# Patient Record
Sex: MALE | Race: WHITE | NOT HISPANIC OR LATINO
[De-identification: names, ages, dates, MRNs, and addresses within clinical notes are randomized per-mention and may not be internally consistent; named-entity substitution may affect disease eponyms.]

---

## 2017-08-05 ENCOUNTER — TRANSCRIPTION ENCOUNTER (OUTPATIENT)
Age: 67
End: 2017-08-05

## 2017-10-07 ENCOUNTER — OUTPATIENT (OUTPATIENT)
Dept: OUTPATIENT SERVICES | Facility: HOSPITAL | Age: 67
LOS: 1 days | End: 2017-10-07
Payer: COMMERCIAL

## 2017-10-07 ENCOUNTER — APPOINTMENT (OUTPATIENT)
Dept: ULTRASOUND IMAGING | Facility: CLINIC | Age: 67
End: 2017-10-07

## 2017-10-07 DIAGNOSIS — Z00.8 ENCOUNTER FOR OTHER GENERAL EXAMINATION: ICD-10-CM

## 2017-10-07 PROCEDURE — 76775 US EXAM ABDO BACK WALL LIM: CPT

## 2017-10-07 PROCEDURE — 76770 US EXAM ABDO BACK WALL COMP: CPT

## 2017-10-07 PROCEDURE — 76775 US EXAM ABDO BACK WALL LIM: CPT | Mod: 26

## 2017-10-07 PROCEDURE — 76770 US EXAM ABDO BACK WALL COMP: CPT | Mod: 26

## 2017-10-11 ENCOUNTER — TRANSCRIPTION ENCOUNTER (OUTPATIENT)
Age: 67
End: 2017-10-11

## 2017-10-11 ENCOUNTER — APPOINTMENT (OUTPATIENT)
Dept: UROLOGY | Facility: CLINIC | Age: 67
End: 2017-10-11
Payer: COMMERCIAL

## 2017-10-11 VITALS
WEIGHT: 200 LBS | RESPIRATION RATE: 17 BRPM | TEMPERATURE: 98.3 F | HEART RATE: 71 BPM | DIASTOLIC BLOOD PRESSURE: 73 MMHG | HEIGHT: 68 IN | SYSTOLIC BLOOD PRESSURE: 150 MMHG | BODY MASS INDEX: 30.31 KG/M2

## 2017-10-11 DIAGNOSIS — Z78.9 OTHER SPECIFIED HEALTH STATUS: ICD-10-CM

## 2017-10-11 DIAGNOSIS — Z86.39 PERSONAL HISTORY OF OTHER ENDOCRINE, NUTRITIONAL AND METABOLIC DISEASE: ICD-10-CM

## 2017-10-11 DIAGNOSIS — Z86.79 PERSONAL HISTORY OF OTHER DISEASES OF THE CIRCULATORY SYSTEM: ICD-10-CM

## 2017-10-11 PROCEDURE — 99205 OFFICE O/P NEW HI 60 MIN: CPT

## 2017-10-11 RX ORDER — MUPIROCIN 20 MG/G
2 OINTMENT TOPICAL
Qty: 22 | Refills: 0 | Status: ACTIVE | COMMUNITY
Start: 2017-08-05

## 2017-10-12 LAB — BACTERIA UR CULT: NORMAL

## 2017-10-27 ENCOUNTER — OUTPATIENT (OUTPATIENT)
Dept: OUTPATIENT SERVICES | Facility: HOSPITAL | Age: 67
LOS: 1 days | End: 2017-10-27
Payer: COMMERCIAL

## 2017-10-27 VITALS
WEIGHT: 201.06 LBS | HEIGHT: 68 IN | DIASTOLIC BLOOD PRESSURE: 81 MMHG | SYSTOLIC BLOOD PRESSURE: 140 MMHG | OXYGEN SATURATION: 100 % | TEMPERATURE: 98 F | HEART RATE: 64 BPM | RESPIRATION RATE: 16 BRPM

## 2017-10-27 DIAGNOSIS — I10 ESSENTIAL (PRIMARY) HYPERTENSION: ICD-10-CM

## 2017-10-27 DIAGNOSIS — Z01.818 ENCOUNTER FOR OTHER PREPROCEDURAL EXAMINATION: ICD-10-CM

## 2017-10-27 DIAGNOSIS — E78.5 HYPERLIPIDEMIA, UNSPECIFIED: ICD-10-CM

## 2017-10-27 DIAGNOSIS — Z98.890 OTHER SPECIFIED POSTPROCEDURAL STATES: Chronic | ICD-10-CM

## 2017-10-27 DIAGNOSIS — N21.0 CALCULUS IN BLADDER: ICD-10-CM

## 2017-10-27 LAB
ANION GAP SERPL CALC-SCNC: 14 MMOL/L — SIGNIFICANT CHANGE UP (ref 5–17)
BUN SERPL-MCNC: 18 MG/DL — SIGNIFICANT CHANGE UP (ref 7–23)
CALCIUM SERPL-MCNC: 9.3 MG/DL — SIGNIFICANT CHANGE UP (ref 8.4–10.5)
CHLORIDE SERPL-SCNC: 98 MMOL/L — SIGNIFICANT CHANGE UP (ref 96–108)
CO2 SERPL-SCNC: 28 MMOL/L — SIGNIFICANT CHANGE UP (ref 22–31)
CREAT SERPL-MCNC: 0.82 MG/DL — SIGNIFICANT CHANGE UP (ref 0.5–1.3)
GLUCOSE SERPL-MCNC: 83 MG/DL — SIGNIFICANT CHANGE UP (ref 70–99)
HCT VFR BLD CALC: 42.4 % — SIGNIFICANT CHANGE UP (ref 39–50)
HGB BLD-MCNC: 14.6 G/DL — SIGNIFICANT CHANGE UP (ref 13–17)
MCHC RBC-ENTMCNC: 33.2 PG — SIGNIFICANT CHANGE UP (ref 27–34)
MCHC RBC-ENTMCNC: 34.4 GM/DL — SIGNIFICANT CHANGE UP (ref 32–36)
MCV RBC AUTO: 96.4 FL — SIGNIFICANT CHANGE UP (ref 80–100)
PLATELET # BLD AUTO: 247 K/UL — SIGNIFICANT CHANGE UP (ref 150–400)
POTASSIUM SERPL-MCNC: 3.5 MMOL/L — SIGNIFICANT CHANGE UP (ref 3.5–5.3)
POTASSIUM SERPL-SCNC: 3.5 MMOL/L — SIGNIFICANT CHANGE UP (ref 3.5–5.3)
RBC # BLD: 4.4 M/UL — SIGNIFICANT CHANGE UP (ref 4.2–5.8)
RBC # FLD: 13.4 % — SIGNIFICANT CHANGE UP (ref 10.3–14.5)
SODIUM SERPL-SCNC: 140 MMOL/L — SIGNIFICANT CHANGE UP (ref 135–145)
WBC # BLD: 8.81 K/UL — SIGNIFICANT CHANGE UP (ref 3.8–10.5)
WBC # FLD AUTO: 8.81 K/UL — SIGNIFICANT CHANGE UP (ref 3.8–10.5)

## 2017-10-27 PROCEDURE — G0463: CPT

## 2017-10-27 PROCEDURE — 80048 BASIC METABOLIC PNL TOTAL CA: CPT

## 2017-10-27 PROCEDURE — 85027 COMPLETE CBC AUTOMATED: CPT

## 2017-10-27 NOTE — H&P PST ADULT - NSANTHOSAYNRD_GEN_A_CORE
No. DAMON screening performed.  STOP BANG Legend: 0-2 = LOW Risk; 3-4 = INTERMEDIATE Risk; 5-8 = HIGH Risk

## 2017-10-27 NOTE — H&P PST ADULT - ABILITY TO HEAR (WITH HEARING AID OR HEARING APPLIANCE IF NORMALLY USED):
No recurrent episodes of acute gouty arthritis. Check uric acid level today. Continue allopurinol 100 mg daily.   Adequate: hears normal conversation without difficulty

## 2017-10-27 NOTE — H&P PST ADULT - HISTORY OF PRESENT ILLNESS
67 Y/O Male  C/O Bilateral Flank pain, pelvic pain 9/2017. S/P Renal ultrasound revealed bilateral cortical cysts with area of cortical atrophy and scarring bilaterally, bladder diverticulum and a 1.1cm bladder stone, enlarged prostate , post void residual approximately 37 cc.  Presents for Cystolitholapaxy .

## 2017-11-03 ENCOUNTER — APPOINTMENT (OUTPATIENT)
Dept: UROLOGY | Facility: HOSPITAL | Age: 67
End: 2017-11-03

## 2017-11-03 ENCOUNTER — OUTPATIENT (OUTPATIENT)
Dept: OUTPATIENT SERVICES | Facility: HOSPITAL | Age: 67
LOS: 1 days | Discharge: HOME | End: 2017-11-03
Payer: COMMERCIAL

## 2017-11-03 ENCOUNTER — RESULT REVIEW (OUTPATIENT)
Age: 67
End: 2017-11-03

## 2017-11-03 ENCOUNTER — TRANSCRIPTION ENCOUNTER (OUTPATIENT)
Age: 67
End: 2017-11-03

## 2017-11-03 VITALS
HEART RATE: 71 BPM | SYSTOLIC BLOOD PRESSURE: 135 MMHG | RESPIRATION RATE: 15 BRPM | DIASTOLIC BLOOD PRESSURE: 68 MMHG | OXYGEN SATURATION: 95 % | TEMPERATURE: 97 F

## 2017-11-03 VITALS
TEMPERATURE: 98 F | WEIGHT: 201.06 LBS | SYSTOLIC BLOOD PRESSURE: 129 MMHG | HEART RATE: 74 BPM | OXYGEN SATURATION: 100 % | DIASTOLIC BLOOD PRESSURE: 80 MMHG | RESPIRATION RATE: 18 BRPM | HEIGHT: 68 IN

## 2017-11-03 DIAGNOSIS — N21.0 CALCULUS IN BLADDER: ICD-10-CM

## 2017-11-03 DIAGNOSIS — Z98.890 OTHER SPECIFIED POSTPROCEDURAL STATES: Chronic | ICD-10-CM

## 2017-11-03 PROCEDURE — 88300 SURGICAL PATH GROSS: CPT | Mod: 26

## 2017-11-03 PROCEDURE — 76000 FLUOROSCOPY <1 HR PHYS/QHP: CPT

## 2017-11-03 PROCEDURE — C1769: CPT

## 2017-11-03 PROCEDURE — 52332 CYSTOSCOPY AND TREATMENT: CPT | Mod: LT

## 2017-11-03 PROCEDURE — 52318 REMOVE BLADDER STONE: CPT

## 2017-11-03 PROCEDURE — 52317 REMOVE BLADDER STONE: CPT

## 2017-11-03 PROCEDURE — C1758: CPT

## 2017-11-03 PROCEDURE — C1889: CPT

## 2017-11-03 PROCEDURE — 74420 UROGRAPHY RTRGR +-KUB: CPT | Mod: 26

## 2017-11-03 PROCEDURE — 88300 SURGICAL PATH GROSS: CPT

## 2017-11-03 PROCEDURE — 52300 CYSTOSCOPY AND TREATMENT: CPT

## 2017-11-03 PROCEDURE — C2617: CPT

## 2017-11-03 PROCEDURE — 82365 CALCULUS SPECTROSCOPY: CPT

## 2017-11-03 PROCEDURE — 87086 URINE CULTURE/COLONY COUNT: CPT

## 2017-11-03 RX ORDER — OXYCODONE AND ACETAMINOPHEN 5; 325 MG/1; MG/1
1 TABLET ORAL EVERY 4 HOURS
Qty: 0 | Refills: 0 | Status: DISCONTINUED | OUTPATIENT
Start: 2017-11-03 | End: 2017-11-03

## 2017-11-03 RX ORDER — OMEPRAZOLE 10 MG/1
1 CAPSULE, DELAYED RELEASE ORAL
Qty: 0 | Refills: 0 | COMMUNITY

## 2017-11-03 RX ORDER — TAMSULOSIN HYDROCHLORIDE 0.4 MG/1
0.4 CAPSULE ORAL ONCE
Qty: 0 | Refills: 0 | Status: COMPLETED | OUTPATIENT
Start: 2017-11-03 | End: 2017-11-03

## 2017-11-03 RX ORDER — ACETAMINOPHEN 500 MG
2 TABLET ORAL
Qty: 0 | Refills: 0 | COMMUNITY
Start: 2017-11-03

## 2017-11-03 RX ORDER — SODIUM CHLORIDE 9 MG/ML
3 INJECTION INTRAMUSCULAR; INTRAVENOUS; SUBCUTANEOUS EVERY 8 HOURS
Qty: 0 | Refills: 0 | Status: DISCONTINUED | OUTPATIENT
Start: 2017-11-03 | End: 2017-11-03

## 2017-11-03 RX ORDER — CEPHALEXIN 500 MG
1 CAPSULE ORAL
Qty: 15 | Refills: 0 | OUTPATIENT
Start: 2017-11-03 | End: 2017-11-08

## 2017-11-03 RX ORDER — ATORVASTATIN CALCIUM 80 MG/1
1 TABLET, FILM COATED ORAL
Qty: 0 | Refills: 0 | COMMUNITY

## 2017-11-03 RX ORDER — TAMSULOSIN HYDROCHLORIDE 0.4 MG/1
1 CAPSULE ORAL
Qty: 60 | Refills: 0 | OUTPATIENT
Start: 2017-11-03 | End: 2017-12-03

## 2017-11-03 RX ORDER — CEFAZOLIN SODIUM 1 G
2000 VIAL (EA) INJECTION ONCE
Qty: 0 | Refills: 0 | Status: COMPLETED | OUTPATIENT
Start: 2017-11-03 | End: 2017-11-03

## 2017-11-03 RX ORDER — ACETAMINOPHEN 500 MG
650 TABLET ORAL EVERY 6 HOURS
Qty: 0 | Refills: 0 | Status: DISCONTINUED | OUTPATIENT
Start: 2017-11-03 | End: 2017-11-18

## 2017-11-03 RX ORDER — ONDANSETRON 8 MG/1
4 TABLET, FILM COATED ORAL EVERY 8 HOURS
Qty: 0 | Refills: 0 | Status: DISCONTINUED | OUTPATIENT
Start: 2017-11-03 | End: 2017-11-03

## 2017-11-03 RX ORDER — LOSARTAN/HYDROCHLOROTHIAZIDE 100MG-25MG
1 TABLET ORAL
Qty: 0 | Refills: 0 | COMMUNITY

## 2017-11-03 RX ORDER — HYDROMORPHONE HYDROCHLORIDE 2 MG/ML
0.5 INJECTION INTRAMUSCULAR; INTRAVENOUS; SUBCUTANEOUS
Qty: 0 | Refills: 0 | Status: DISCONTINUED | OUTPATIENT
Start: 2017-11-03 | End: 2017-11-03

## 2017-11-03 RX ORDER — SODIUM CHLORIDE 9 MG/ML
1000 INJECTION, SOLUTION INTRAVENOUS
Qty: 0 | Refills: 0 | Status: DISCONTINUED | OUTPATIENT
Start: 2017-11-03 | End: 2017-11-18

## 2017-11-03 RX ADMIN — TAMSULOSIN HYDROCHLORIDE 0.4 MILLIGRAM(S): 0.4 CAPSULE ORAL at 14:35

## 2017-11-03 NOTE — ASU DISCHARGE PLAN (ADULT/PEDIATRIC). - NOTIFY
Inability to Tolerate Liquids or Foods/Unable to Urinate/Persistent Nausea and Vomiting/Fever greater than 101/Bleeding that does not stop

## 2017-11-03 NOTE — ASU DISCHARGE PLAN (ADULT/PEDIATRIC). - MEDICATION SUMMARY - MEDICATIONS TO TAKE
I will START or STAY ON the medications listed below when I get home from the hospital:    acetaminophen 325 mg oral tablet  -- 2 tab(s) by mouth every 6 hours, As needed, Mild Pain (1 - 3)  -- Indication: For mild pain    oxyCODONE-acetaminophen 5 mg-325 mg oral tablet  -- 1 tab(s) by mouth every 6 hours, As Needed -moderate to severe pain MDD:4 tabs  -- Indication: For moderate to severe pain    Flomax 0.4 mg oral capsule  -- 1 cap(s) by mouth 2 times a day   -- It is very important that you take or use this exactly as directed.  Do not skip doses or discontinue unless directed by your doctor.  May cause drowsiness.  Alcohol may intensify this effect.  Use care when operating dangerous machinery.  Some non-prescription drugs may aggravate your condition.  Read all labels carefully.  If a warning appears, check with your doctor before taking.  Swallow whole.  Do not crush.  Take with food or milk.    -- Indication: For enlarged prostate, stent pain    Lipitor 10 mg oral tablet  -- 1 tab(s) by mouth once a day  -- Indication: For home med    losartan-hydrochlorothiazide 100mg-25mg oral tablet  -- 1 tab(s) by mouth once a day  -- Indication: For home med    Keflex 500 mg oral capsule  -- 1 cap(s) by mouth every 8 hours   -- Finish all this medication unless otherwise directed by prescriber.    -- Indication: For post-op antibiotics    omeprazole 40 mg oral delayed release capsule  -- 1 cap(s) by mouth once a day  -- Indication: For home med

## 2017-11-03 NOTE — BRIEF OPERATIVE NOTE - PROCEDURE
<<-----Click on this checkbox to enter Procedure Cystolitholapaxy  11/03/2017    Active  [id number]  Cystoscopy with resection of orthotopic ureterocele  11/03/2017    Active  [id number]

## 2017-11-03 NOTE — BRIEF OPERATIVE NOTE - POST-OP DX
Bladder diverticulum  11/03/2017    Active  Trevor Suarez  Ureterocele, congenital  11/03/2017    Active  Trevor Suarez

## 2017-11-03 NOTE — BRIEF OPERATIVE NOTE - PRE-OP DX
Bladder diverticulum  11/03/2017    Active  Trevor Suarez  Bladder stone  11/03/2017    Active  Trevor Suarez

## 2017-11-05 LAB
CULTURE RESULTS: SIGNIFICANT CHANGE UP
SPECIMEN SOURCE: SIGNIFICANT CHANGE UP

## 2017-11-06 LAB — SURGICAL PATHOLOGY STUDY: SIGNIFICANT CHANGE UP

## 2017-11-07 LAB — COMPN STONE: SIGNIFICANT CHANGE UP

## 2017-11-29 ENCOUNTER — OUTPATIENT (OUTPATIENT)
Dept: OUTPATIENT SERVICES | Facility: HOSPITAL | Age: 67
LOS: 1 days | End: 2017-11-29
Payer: COMMERCIAL

## 2017-11-29 ENCOUNTER — APPOINTMENT (OUTPATIENT)
Dept: UROLOGY | Facility: CLINIC | Age: 67
End: 2017-11-29
Payer: COMMERCIAL

## 2017-11-29 VITALS
RESPIRATION RATE: 17 BRPM | HEART RATE: 77 BPM | SYSTOLIC BLOOD PRESSURE: 132 MMHG | DIASTOLIC BLOOD PRESSURE: 81 MMHG | TEMPERATURE: 98.8 F

## 2017-11-29 DIAGNOSIS — Z98.890 OTHER SPECIFIED POSTPROCEDURAL STATES: Chronic | ICD-10-CM

## 2017-11-29 DIAGNOSIS — N21.0 CALCULUS IN BLADDER: ICD-10-CM

## 2017-11-29 PROCEDURE — 52310 CYSTOSCOPY AND TREATMENT: CPT

## 2017-11-29 PROCEDURE — 99214 OFFICE O/P EST MOD 30 MIN: CPT | Mod: 25

## 2017-12-04 DIAGNOSIS — N32.3 DIVERTICULUM OF BLADDER: ICD-10-CM

## 2017-12-04 DIAGNOSIS — N40.1 BENIGN PROSTATIC HYPERPLASIA WITH LOWER URINARY TRACT SYMPTOMS: ICD-10-CM

## 2017-12-04 DIAGNOSIS — N21.0 CALCULUS IN BLADDER: ICD-10-CM

## 2018-01-24 ENCOUNTER — APPOINTMENT (OUTPATIENT)
Dept: UROLOGY | Facility: CLINIC | Age: 68
End: 2018-01-24
Payer: MEDICARE

## 2018-01-24 DIAGNOSIS — R82.99 OTHER ABNORMAL FINDINGS IN URINE: ICD-10-CM

## 2018-01-24 DIAGNOSIS — N32.3 DIVERTICULUM OF BLADDER: ICD-10-CM

## 2018-01-24 PROCEDURE — 99213 OFFICE O/P EST LOW 20 MIN: CPT

## 2018-01-24 RX ORDER — CEPHALEXIN 500 MG/1
500 CAPSULE ORAL
Qty: 10 | Refills: 0 | Status: COMPLETED | COMMUNITY
Start: 2017-08-10 | End: 2018-01-24

## 2018-04-22 ENCOUNTER — EMERGENCY (EMERGENCY)
Facility: HOSPITAL | Age: 68
LOS: 0 days | Discharge: HOME | End: 2018-04-22
Attending: EMERGENCY MEDICINE | Admitting: EMERGENCY MEDICINE
Payer: COMMERCIAL

## 2018-04-22 VITALS
RESPIRATION RATE: 17 BRPM | SYSTOLIC BLOOD PRESSURE: 169 MMHG | OXYGEN SATURATION: 96 % | DIASTOLIC BLOOD PRESSURE: 106 MMHG | TEMPERATURE: 98 F | HEART RATE: 91 BPM

## 2018-04-22 VITALS — HEIGHT: 68 IN | WEIGHT: 207.01 LBS

## 2018-04-22 DIAGNOSIS — Z98.890 OTHER SPECIFIED POSTPROCEDURAL STATES: Chronic | ICD-10-CM
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
## 2018-04-22 DIAGNOSIS — R39.15 URGENCY OF URINATION: ICD-10-CM

## 2018-04-22 DIAGNOSIS — E78.5 HYPERLIPIDEMIA, UNSPECIFIED: ICD-10-CM

## 2018-04-22 DIAGNOSIS — S01.21XA LACERATION WITHOUT FOREIGN BODY OF NOSE, INITIAL ENCOUNTER: ICD-10-CM

## 2018-04-22 DIAGNOSIS — S00.81XA ABRASION OF OTHER PART OF HEAD, INITIAL ENCOUNTER: ICD-10-CM

## 2018-04-22 DIAGNOSIS — S02.2XXA FRACTURE OF NASAL BONES, INITIAL ENCOUNTER FOR CLOSED FRACTURE: ICD-10-CM

## 2018-04-22 DIAGNOSIS — I10 ESSENTIAL (PRIMARY) HYPERTENSION: ICD-10-CM

## 2018-04-22 DIAGNOSIS — N40.1 BENIGN PROSTATIC HYPERPLASIA WITH LOWER URINARY TRACT SYMPTOMS: ICD-10-CM

## 2018-04-22 PROCEDURE — 12011 RPR F/E/E/N/L/M 2.5 CM/<: CPT

## 2018-04-22 PROCEDURE — 72125 CT NECK SPINE W/O DYE: CPT | Mod: 26

## 2018-04-22 PROCEDURE — 76377 3D RENDER W/INTRP POSTPROCES: CPT | Mod: 26

## 2018-04-22 PROCEDURE — 70450 CT HEAD/BRAIN W/O DYE: CPT | Mod: 26

## 2018-04-22 PROCEDURE — 99283 EMERGENCY DEPT VISIT LOW MDM: CPT

## 2018-04-22 PROCEDURE — 70486 CT MAXILLOFACIAL W/O DYE: CPT | Mod: 26

## 2018-04-22 RX ORDER — ACETAMINOPHEN 500 MG
1000 TABLET ORAL ONCE
Qty: 0 | Refills: 0 | Status: COMPLETED | OUTPATIENT
Start: 2018-04-22 | End: 2018-04-22

## 2018-04-22 RX ADMIN — Medication 1 TABLET(S): at 23:19

## 2018-04-22 RX ADMIN — Medication 1000 MILLIGRAM(S): at 20:26

## 2018-04-22 NOTE — ED ADULT NURSE NOTE - CHPI ED SYMPTOMS NEG
no confusion/no loss of consciousness/no deformity/no numbness/no fever/no vomiting/no weakness/no tingling/no bleeding

## 2018-04-22 NOTE — ED STATDOCS - SKIN, MLM
Large abrasion across mid-forehead running horizontally, abrasion and laceration over bridge of nose laceration is 1cm running vertically, irregular, superficial with no active bleeding. tenderness over nasal bridge, no septal hematoma, no orbital tenderness

## 2018-04-22 NOTE — ED STATDOCS - OBJECTIVE STATEMENT
68 y/o male with PMHx of BPH, kidney stones, presets to the ED s/p fall into his [employer]. Pt confirms that he "saw stars" but did not lose consciousness. Pt states he tried to clean the abrasion. Pt confirms to the sight of the abrasion. Pt did experience some epistaxis. Patient's Tetanus shot is up to date. Pt is not on anticoagulants. Pt did not take any medications for the pain. PCP Dr. Lujan

## 2018-04-22 NOTE — ED STATDOCS - ATTENDING CONTRIBUTION TO CARE
Attending Contribution to Care: I, Marija Cruz, performed the initial face to face bedside interview with this patient regarding history of present illness, review of symptoms and relevant past medical, social and family history.  I completed an independent physical examination.  I was the initial provider who evaluated this patient. I have signed out the follow up of any pending tests (i.e. labs, radiological studies) to the ACP.  I have communicated the patient’s plan of care and disposition with the ACP.

## 2018-04-22 NOTE — ED STATDOCS - PHYSICAL EXAMINATION
GEN: AOX3, NAD. HEENT: Throat clear. +1.0 cm vertical laceration noted over external nose. Mild STS external nose. slight deformity of nose is noted. +Tenderness. Minimal bleeding. Head NC/AT. +Abrasions on forehead. NECK: Supple, No JVD. FROM. C-spine non-tender. CV:S1S2, RRR, LUNGS: CTA b/l, no w/r/r. ABD: Soft, NT/ND, no rebound, no guarding. No CVAT. EXT: No e/c/c. 2+ distal pulses. SKIN: No rashes. NEURO: No focal deficits. CN II-XII intact. FROM. 5/5 motor and sensory. MANDY Morel

## 2018-04-22 NOTE — ED ADULT TRIAGE NOTE - CHIEF COMPLAINT QUOTE
pt s/p mechanical trip and fall in garage. -LOC. pt struck forehead and nose. pt denies blood thinners. pt has laceration on bridge of nose.

## 2018-04-22 NOTE — ED STATDOCS - PROGRESS NOTE DETAILS
Patient is feeling much better, tests/labs reviewed. case discussed with attending. OK to dc home. MANDY Morel

## 2018-04-22 NOTE — ED PROCEDURE NOTE - PROCEDURE ADDITIONAL DETAILS
steri strips applied. patient stable, tolerated well. Patient is feeling much better, tests/labs reviewed. case discussed with attending. OK to dc home. MANDY Morel

## 2018-04-22 NOTE — ED STATDOCS - PMH
Benign prostatic hyperplasia with urinary frequency    Calculus in bladder    Gastroesophageal reflux disease without esophagitis    Hyperlipidemia, unspecified hyperlipidemia type    Hypertension, unspecified type    Kidney cysts    Kidney stone    Obesity (BMI 30.0-34.9)    Shoulder disorder

## 2018-04-22 NOTE — ED STATDOCS - CARE PLAN
Principal Discharge DX:	Nasal fracture  Secondary Diagnosis:	Nasal laceration  Secondary Diagnosis:	Facial injury

## 2018-07-19 PROBLEM — N21.0 CALCULUS IN BLADDER: Chronic | Status: ACTIVE | Noted: 2017-10-27

## 2018-07-19 PROBLEM — K21.9 GASTRO-ESOPHAGEAL REFLUX DISEASE WITHOUT ESOPHAGITIS: Chronic | Status: ACTIVE | Noted: 2017-10-27

## 2018-07-19 PROBLEM — N28.1 CYST OF KIDNEY, ACQUIRED: Chronic | Status: ACTIVE | Noted: 2017-10-27

## 2018-07-19 PROBLEM — N20.0 CALCULUS OF KIDNEY: Chronic | Status: ACTIVE | Noted: 2017-10-27

## 2018-07-19 PROBLEM — E66.9 OBESITY, UNSPECIFIED: Chronic | Status: ACTIVE | Noted: 2017-10-27

## 2018-07-19 PROBLEM — N40.1 BENIGN PROSTATIC HYPERPLASIA WITH LOWER URINARY TRACT SYMPTOMS: Chronic | Status: ACTIVE | Noted: 2017-10-27

## 2018-07-19 PROBLEM — M25.9 JOINT DISORDER, UNSPECIFIED: Chronic | Status: ACTIVE | Noted: 2017-10-27

## 2018-07-19 PROBLEM — I10 ESSENTIAL (PRIMARY) HYPERTENSION: Chronic | Status: ACTIVE | Noted: 2017-10-27

## 2018-07-19 PROBLEM — E78.5 HYPERLIPIDEMIA, UNSPECIFIED: Chronic | Status: ACTIVE | Noted: 2017-10-27

## 2018-07-23 ENCOUNTER — APPOINTMENT (OUTPATIENT)
Dept: UROLOGY | Facility: CLINIC | Age: 68
End: 2018-07-23

## 2018-07-30 ENCOUNTER — FORM ENCOUNTER (OUTPATIENT)
Age: 68
End: 2018-07-30

## 2018-07-30 PROBLEM — Z87.19 HISTORY OF GASTROESOPHAGEAL REFLUX (GERD): Status: RESOLVED | Noted: 2018-07-30 | Resolved: 2018-07-30

## 2018-07-30 RX ORDER — BENZOCAINE/BENZALKONIUM/ALOE/E 5 %-0.13 %
10 CREAM (GRAM) TOPICAL
Refills: 0 | Status: ACTIVE | COMMUNITY

## 2018-07-31 ENCOUNTER — OUTPATIENT (OUTPATIENT)
Dept: OUTPATIENT SERVICES | Facility: HOSPITAL | Age: 68
LOS: 1 days | End: 2018-07-31
Payer: COMMERCIAL

## 2018-07-31 ENCOUNTER — APPOINTMENT (OUTPATIENT)
Dept: NEUROLOGY | Facility: CLINIC | Age: 68
End: 2018-07-31
Payer: COMMERCIAL

## 2018-07-31 ENCOUNTER — APPOINTMENT (OUTPATIENT)
Dept: MRI IMAGING | Facility: CLINIC | Age: 68
End: 2018-07-31
Payer: COMMERCIAL

## 2018-07-31 VITALS
WEIGHT: 212 LBS | SYSTOLIC BLOOD PRESSURE: 122 MMHG | HEIGHT: 68 IN | BODY MASS INDEX: 32.13 KG/M2 | OXYGEN SATURATION: 98 % | HEART RATE: 72 BPM | DIASTOLIC BLOOD PRESSURE: 72 MMHG

## 2018-07-31 DIAGNOSIS — Z00.8 ENCOUNTER FOR OTHER GENERAL EXAMINATION: ICD-10-CM

## 2018-07-31 DIAGNOSIS — Z87.19 PERSONAL HISTORY OF OTHER DISEASES OF THE DIGESTIVE SYSTEM: ICD-10-CM

## 2018-07-31 DIAGNOSIS — Z98.890 OTHER SPECIFIED POSTPROCEDURAL STATES: Chronic | ICD-10-CM

## 2018-07-31 DIAGNOSIS — Z81.8 FAMILY HISTORY OF OTHER MENTAL AND BEHAVIORAL DISORDERS: ICD-10-CM

## 2018-07-31 PROCEDURE — 70551 MRI BRAIN STEM W/O DYE: CPT | Mod: 26

## 2018-07-31 PROCEDURE — 99204 OFFICE O/P NEW MOD 45 MIN: CPT

## 2018-07-31 PROCEDURE — 70551 MRI BRAIN STEM W/O DYE: CPT

## 2018-07-31 RX ORDER — TAMSULOSIN HYDROCHLORIDE 0.4 MG/1
0.4 CAPSULE ORAL
Qty: 180 | Refills: 3 | Status: DISCONTINUED | COMMUNITY
Start: 2017-10-11 | End: 2018-07-31

## 2018-07-31 RX ORDER — TADALAFIL 5 MG/1
5 TABLET, FILM COATED ORAL
Refills: 0 | Status: DISCONTINUED | COMMUNITY
End: 2018-07-31

## 2018-07-31 RX ORDER — AZITHROMYCIN 250 MG/1
250 TABLET, FILM COATED ORAL
Qty: 6 | Refills: 0 | Status: DISCONTINUED | COMMUNITY
Start: 2017-08-10 | End: 2018-07-31

## 2018-07-31 RX ORDER — TAMSULOSIN HYDROCHLORIDE 0.4 MG/1
0.4 CAPSULE ORAL
Refills: 0 | Status: DISCONTINUED | COMMUNITY
End: 2018-07-31

## 2018-09-07 ENCOUNTER — APPOINTMENT (OUTPATIENT)
Dept: DERMATOLOGY | Facility: CLINIC | Age: 68
End: 2018-09-07
Payer: COMMERCIAL

## 2018-09-07 VITALS — WEIGHT: 208 LBS | BODY MASS INDEX: 31.52 KG/M2 | HEIGHT: 68 IN

## 2018-09-07 DIAGNOSIS — L81.4 OTHER MELANIN HYPERPIGMENTATION: ICD-10-CM

## 2018-09-07 DIAGNOSIS — L82.1 OTHER SEBORRHEIC KERATOSIS: ICD-10-CM

## 2018-09-07 PROCEDURE — 99203 OFFICE O/P NEW LOW 30 MIN: CPT

## 2018-09-07 RX ORDER — AMLODIPINE BESYLATE 5 MG/1
5 TABLET ORAL
Refills: 0 | Status: DISCONTINUED | COMMUNITY
End: 2018-09-07

## 2018-09-07 RX ORDER — LABETALOL HYDROCHLORIDE 200 MG/1
200 TABLET, FILM COATED ORAL
Refills: 0 | Status: DISCONTINUED | COMMUNITY
End: 2018-09-07

## 2018-09-07 RX ORDER — ATORVASTATIN CALCIUM 10 MG/1
10 TABLET, FILM COATED ORAL
Refills: 0 | Status: DISCONTINUED | COMMUNITY
End: 2018-09-07

## 2018-09-07 RX ORDER — ATORVASTATIN CALCIUM 10 MG/1
10 TABLET, FILM COATED ORAL
Qty: 90 | Refills: 0 | Status: DISCONTINUED | COMMUNITY
Start: 2017-05-26 | End: 2018-09-07

## 2018-09-07 RX ORDER — OMEPRAZOLE 40 MG/1
40 CAPSULE, DELAYED RELEASE ORAL
Qty: 90 | Refills: 0 | Status: DISCONTINUED | COMMUNITY
Start: 2017-06-27 | End: 2018-09-07

## 2018-09-07 RX ORDER — POTASSIUM CITRATE AND CITRIC ACID 3.3; 1.002 G/1; G/1
3300-1002 GRANULE, FOR SOLUTION ORAL TWICE DAILY
Qty: 90 | Refills: 3 | Status: DISCONTINUED | COMMUNITY
Start: 2018-01-24 | End: 2018-09-07

## 2018-09-07 RX ORDER — LOSARTAN POTASSIUM AND HYDROCHLOROTHIAZIDE 25; 100 MG/1; MG/1
100-25 TABLET ORAL
Qty: 90 | Refills: 0 | Status: DISCONTINUED | COMMUNITY
Start: 2017-08-01 | End: 2018-09-07

## 2018-10-15 ENCOUNTER — APPOINTMENT (OUTPATIENT)
Dept: NEUROLOGY | Facility: CLINIC | Age: 68
End: 2018-10-15
Payer: COMMERCIAL

## 2018-10-15 VITALS
HEIGHT: 68 IN | BODY MASS INDEX: 31.52 KG/M2 | DIASTOLIC BLOOD PRESSURE: 80 MMHG | SYSTOLIC BLOOD PRESSURE: 142 MMHG | WEIGHT: 208 LBS | HEART RATE: 77 BPM

## 2018-10-15 PROCEDURE — 99214 OFFICE O/P EST MOD 30 MIN: CPT

## 2019-01-14 ENCOUNTER — APPOINTMENT (OUTPATIENT)
Dept: NEUROLOGY | Facility: CLINIC | Age: 69
End: 2019-01-14

## 2019-04-08 ENCOUNTER — OTHER (OUTPATIENT)
Age: 69
End: 2019-04-08

## 2019-04-15 ENCOUNTER — APPOINTMENT (OUTPATIENT)
Dept: NEUROLOGY | Facility: CLINIC | Age: 69
End: 2019-04-15
Payer: COMMERCIAL

## 2019-04-15 PROCEDURE — 95816 EEG AWAKE AND DROWSY: CPT

## 2019-04-17 ENCOUNTER — TRANSCRIPTION ENCOUNTER (OUTPATIENT)
Age: 69
End: 2019-04-17

## 2019-09-04 ENCOUNTER — APPOINTMENT (OUTPATIENT)
Dept: DERMATOLOGY | Facility: CLINIC | Age: 69
End: 2019-09-04

## 2020-08-26 ENCOUNTER — APPOINTMENT (OUTPATIENT)
Dept: RADIOLOGY | Facility: CLINIC | Age: 70
End: 2020-08-26
Payer: COMMERCIAL

## 2020-08-26 ENCOUNTER — OUTPATIENT (OUTPATIENT)
Dept: OUTPATIENT SERVICES | Facility: HOSPITAL | Age: 70
LOS: 1 days | End: 2020-08-26
Payer: COMMERCIAL

## 2020-08-26 DIAGNOSIS — Z00.8 ENCOUNTER FOR OTHER GENERAL EXAMINATION: ICD-10-CM

## 2020-08-26 DIAGNOSIS — Z98.890 OTHER SPECIFIED POSTPROCEDURAL STATES: Chronic | ICD-10-CM

## 2020-08-26 PROCEDURE — 72100 X-RAY EXAM L-S SPINE 2/3 VWS: CPT

## 2020-08-26 PROCEDURE — 72100 X-RAY EXAM L-S SPINE 2/3 VWS: CPT | Mod: 26

## 2021-02-26 ENCOUNTER — APPOINTMENT (OUTPATIENT)
Dept: UROLOGY | Facility: CLINIC | Age: 71
End: 2021-02-26

## 2021-05-03 ENCOUNTER — APPOINTMENT (OUTPATIENT)
Dept: FAMILY MEDICINE | Facility: CLINIC | Age: 71
End: 2021-05-03
Payer: COMMERCIAL

## 2021-05-03 PROCEDURE — 99072 ADDL SUPL MATRL&STAF TM PHE: CPT

## 2021-05-03 PROCEDURE — 36415 COLL VENOUS BLD VENIPUNCTURE: CPT

## 2021-05-04 LAB
ALBUMIN SERPL ELPH-MCNC: 4.6 G/DL
ALP BLD-CCNC: 50 U/L
ALT SERPL-CCNC: 19 U/L
ANION GAP SERPL CALC-SCNC: 14 MMOL/L
AST SERPL-CCNC: 15 U/L
BILIRUB SERPL-MCNC: 0.6 MG/DL
BUN SERPL-MCNC: 20 MG/DL
CALCIUM SERPL-MCNC: 10.1 MG/DL
CHLORIDE SERPL-SCNC: 104 MMOL/L
CHOLEST SERPL-MCNC: 167 MG/DL
CO2 SERPL-SCNC: 26 MMOL/L
CREAT SERPL-MCNC: 0.94 MG/DL
ESTIMATED AVERAGE GLUCOSE: 114 MG/DL
GLUCOSE SERPL-MCNC: 107 MG/DL
HBA1C MFR BLD HPLC: 5.6 %
HDLC SERPL-MCNC: 54 MG/DL
LDLC SERPL CALC-MCNC: 91 MG/DL
NONHDLC SERPL-MCNC: 113 MG/DL
POTASSIUM SERPL-SCNC: 3.8 MMOL/L
PROT SERPL-MCNC: 7.3 G/DL
PSA SERPL-MCNC: 1.78 NG/ML
SODIUM SERPL-SCNC: 143 MMOL/L
TRIGL SERPL-MCNC: 107 MG/DL

## 2021-05-13 ENCOUNTER — NON-APPOINTMENT (OUTPATIENT)
Age: 71
End: 2021-05-13

## 2021-05-13 DIAGNOSIS — Z82.61 FAMILY HISTORY OF ARTHRITIS: ICD-10-CM

## 2021-05-13 DIAGNOSIS — S46.009A UNSPECIFIED INJURY OF MUSCLE(S) AND TENDON(S) OF THE ROTATOR CUFF OF UNSPECIFIED SHOULDER, INITIAL ENCOUNTER: ICD-10-CM

## 2021-05-13 DIAGNOSIS — Z87.442 PERSONAL HISTORY OF URINARY CALCULI: ICD-10-CM

## 2021-05-13 DIAGNOSIS — Z78.9 OTHER SPECIFIED HEALTH STATUS: ICD-10-CM

## 2021-05-13 DIAGNOSIS — Z86.010 PERSONAL HISTORY OF COLONIC POLYPS: ICD-10-CM

## 2021-05-13 RX ORDER — DOXAZOSIN 1 MG/1
1 TABLET ORAL DAILY
Refills: 0 | Status: ACTIVE | COMMUNITY

## 2021-05-19 ENCOUNTER — APPOINTMENT (OUTPATIENT)
Dept: FAMILY MEDICINE | Facility: CLINIC | Age: 71
End: 2021-05-19
Payer: COMMERCIAL

## 2021-05-19 ENCOUNTER — RX RENEWAL (OUTPATIENT)
Age: 71
End: 2021-05-19

## 2021-05-19 VITALS
OXYGEN SATURATION: 98 % | DIASTOLIC BLOOD PRESSURE: 70 MMHG | HEIGHT: 68 IN | WEIGHT: 205 LBS | SYSTOLIC BLOOD PRESSURE: 128 MMHG | BODY MASS INDEX: 31.07 KG/M2 | HEART RATE: 73 BPM | TEMPERATURE: 97 F

## 2021-05-19 DIAGNOSIS — Z87.898 PERSONAL HISTORY OF OTHER SPECIFIED CONDITIONS: ICD-10-CM

## 2021-05-19 DIAGNOSIS — G31.84 MILD COGNITIVE IMPAIRMENT, SO STATED: ICD-10-CM

## 2021-05-19 DIAGNOSIS — Z91.81 HISTORY OF FALLING: ICD-10-CM

## 2021-05-19 DIAGNOSIS — S00.80XD: ICD-10-CM

## 2021-05-19 DIAGNOSIS — R90.89 OTHER ABNORMAL FINDINGS ON DIAGNOSTIC IMAGING OF CENTRAL NERVOUS SYSTEM: ICD-10-CM

## 2021-05-19 DIAGNOSIS — R82.6 ABNORMAL URINE LEVELS OF SUBSTANCES CHIEFLY NONMEDICINAL AS TO SOURCE: ICD-10-CM
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
## 2021-05-19 DIAGNOSIS — F07.81 POSTCONCUSSIONAL SYNDROME: ICD-10-CM

## 2021-05-19 PROCEDURE — 99072 ADDL SUPL MATRL&STAF TM PHE: CPT

## 2021-05-19 PROCEDURE — 99397 PER PM REEVAL EST PAT 65+ YR: CPT

## 2021-05-19 RX ORDER — METHYLPREDNISOLONE 4 MG/1
4 TABLET ORAL
Refills: 0 | Status: DISCONTINUED | COMMUNITY
End: 2021-05-19

## 2021-05-19 RX ORDER — LOSARTAN POTASSIUM AND HYDROCHLOROTHIAZIDE 25; 100 MG/1; MG/1
100-25 TABLET ORAL
Refills: 0 | Status: ACTIVE | COMMUNITY

## 2021-05-19 RX ORDER — HYDROCHLOROTHIAZIDE 12.5 MG/1
TABLET ORAL
Refills: 0 | Status: DISCONTINUED | COMMUNITY
End: 2021-05-19

## 2021-05-19 NOTE — HISTORY OF PRESENT ILLNESS
[FreeTextEntry1] : CHATO BAEZ is a 70 year old male here for a physical exam.\par  [de-identified] : His last PE was 6/3/2020.\par His last tetanus shot was in 2017\par Pneumovax and Prevnar are up to date.\par He has not had Shingrix \par He has had 2 doses of the Moderna COVID vaccine.\par His last dentist visit was in April.\par His last eye doctor appointment was within the past month. He had a procedure for glaucoma.\par His last dermatologist visit was within the past month\par His diet is fairly good\par Exercise: walking\par His last colonoscopy was in 2014\par \par

## 2021-05-19 NOTE — ASSESSMENT
[FreeTextEntry1] : CHATO BAEZ is a healthy 70 year male. He recently saw his cardiologist who feels he is stable. His blood pressure is controlled on his current medications. His labs were done prior and reviewed with him today. His cholesterol, glucose, and HgbA1c are all improved.

## 2021-05-19 NOTE — PLAN
[FreeTextEntry1] : Reviewed age-appropriate preventive screening tests with patient. He is due for a colonoscopy and will schedule this when he returns from vacation next month.\par \par Discussed clean eating (e.g. Mediterranean style diet) and recommendations for regular exercise/staying as physically active as possible. He has been doing a better job with diet and exercise than he had in the past.\par \par Reviewed importance of good self care (e.g. meditation, yoga, adequate rest, regular exercise, magnesium, clean eating, etc.).\par

## 2021-11-27 ENCOUNTER — FORM ENCOUNTER (OUTPATIENT)
Age: 71
End: 2021-11-27

## 2021-11-28 ENCOUNTER — TRANSCRIPTION ENCOUNTER (OUTPATIENT)
Age: 71
End: 2021-11-28

## 2021-11-29 ENCOUNTER — NON-APPOINTMENT (OUTPATIENT)
Age: 71
End: 2021-11-29

## 2021-12-01 ENCOUNTER — APPOINTMENT (OUTPATIENT)
Dept: DISASTER EMERGENCY | Facility: HOSPITAL | Age: 71
End: 2021-12-01

## 2021-12-02 ENCOUNTER — NON-APPOINTMENT (OUTPATIENT)
Age: 71
End: 2021-12-02

## 2021-12-02 DIAGNOSIS — J01.90 ACUTE SINUSITIS, UNSPECIFIED: ICD-10-CM

## 2021-12-03 ENCOUNTER — OUTPATIENT (OUTPATIENT)
Dept: OUTPATIENT SERVICES | Facility: HOSPITAL | Age: 71
LOS: 1 days | End: 2021-12-03
Payer: COMMERCIAL

## 2021-12-03 ENCOUNTER — APPOINTMENT (OUTPATIENT)
Dept: DISASTER EMERGENCY | Facility: HOSPITAL | Age: 71
End: 2021-12-03

## 2021-12-03 VITALS
OXYGEN SATURATION: 97 % | HEIGHT: 68 IN | RESPIRATION RATE: 18 BRPM | DIASTOLIC BLOOD PRESSURE: 80 MMHG | TEMPERATURE: 98 F | HEART RATE: 65 BPM | SYSTOLIC BLOOD PRESSURE: 166 MMHG | WEIGHT: 210.1 LBS

## 2021-12-03 VITALS
OXYGEN SATURATION: 97 % | HEART RATE: 68 BPM | RESPIRATION RATE: 18 BRPM | DIASTOLIC BLOOD PRESSURE: 88 MMHG | TEMPERATURE: 98 F | SYSTOLIC BLOOD PRESSURE: 157 MMHG

## 2021-12-03 DIAGNOSIS — Z98.890 OTHER SPECIFIED POSTPROCEDURAL STATES: Chronic | ICD-10-CM

## 2021-12-03 DIAGNOSIS — U07.1 COVID-19: ICD-10-CM

## 2021-12-03 PROCEDURE — M0243: CPT

## 2021-12-03 RX ORDER — SODIUM CHLORIDE 9 MG/ML
250 INJECTION INTRAMUSCULAR; INTRAVENOUS; SUBCUTANEOUS
Refills: 0 | Status: COMPLETED | OUTPATIENT
Start: 2021-12-03 | End: 2021-12-03

## 2021-12-03 RX ADMIN — SODIUM CHLORIDE 310 MILLILITER(S): 9 INJECTION INTRAMUSCULAR; INTRAVENOUS; SUBCUTANEOUS at 08:30

## 2021-12-04 ENCOUNTER — TRANSCRIPTION ENCOUNTER (OUTPATIENT)
Age: 71
End: 2021-12-04

## 2021-12-06 ENCOUNTER — TRANSCRIPTION ENCOUNTER (OUTPATIENT)
Age: 71
End: 2021-12-06

## 2021-12-08 ENCOUNTER — APPOINTMENT (OUTPATIENT)
Dept: DISASTER EMERGENCY | Facility: HOSPITAL | Age: 71
End: 2021-12-08

## 2022-02-06 ENCOUNTER — FORM ENCOUNTER (OUTPATIENT)
Age: 72
End: 2022-02-06

## 2022-03-03 ENCOUNTER — APPOINTMENT (OUTPATIENT)
Dept: FAMILY MEDICINE | Facility: CLINIC | Age: 72
End: 2022-03-03
Payer: COMMERCIAL

## 2022-03-03 PROCEDURE — 36415 COLL VENOUS BLD VENIPUNCTURE: CPT

## 2022-03-04 ENCOUNTER — TRANSCRIPTION ENCOUNTER (OUTPATIENT)
Age: 72
End: 2022-03-04

## 2022-03-04 LAB
ALBUMIN SERPL ELPH-MCNC: 4.8 G/DL
ALP BLD-CCNC: 60 U/L
ALT SERPL-CCNC: 24 U/L
ANION GAP SERPL CALC-SCNC: 19 MMOL/L
APPEARANCE: CLEAR
AST SERPL-CCNC: 22 U/L
BACTERIA: NEGATIVE
BILIRUB SERPL-MCNC: 0.5 MG/DL
BILIRUBIN URINE: NEGATIVE
BLOOD URINE: NEGATIVE
BUN SERPL-MCNC: 24 MG/DL
CALCIUM SERPL-MCNC: 10.3 MG/DL
CHLORIDE SERPL-SCNC: 100 MMOL/L
CHOLEST SERPL-MCNC: 184 MG/DL
CO2 SERPL-SCNC: 23 MMOL/L
COLOR: YELLOW
CREAT SERPL-MCNC: 1.09 MG/DL
EGFR: 73 ML/MIN/1.73M2
ESTIMATED AVERAGE GLUCOSE: 128 MG/DL
GLUCOSE QUALITATIVE U: NEGATIVE
GLUCOSE SERPL-MCNC: 116 MG/DL
HBA1C MFR BLD HPLC: 6.1 %
HDLC SERPL-MCNC: 48 MG/DL
HYALINE CASTS: 3 /LPF
KETONES URINE: NORMAL
LDLC SERPL CALC-MCNC: 92 MG/DL
LEUKOCYTE ESTERASE URINE: NEGATIVE
MICROSCOPIC-UA: NORMAL
NITRITE URINE: NEGATIVE
NONHDLC SERPL-MCNC: 136 MG/DL
PH URINE: 6
POTASSIUM SERPL-SCNC: 4.2 MMOL/L
PROT SERPL-MCNC: 7.5 G/DL
PROTEIN URINE: ABNORMAL
PSA SERPL-MCNC: 1.74 NG/ML
RED BLOOD CELLS URINE: 1 /HPF
SODIUM SERPL-SCNC: 142 MMOL/L
SPECIFIC GRAVITY URINE: 1.02
SQUAMOUS EPITHELIAL CELLS: 1 /HPF
TRIGL SERPL-MCNC: 217 MG/DL
UROBILINOGEN URINE: NORMAL
WHITE BLOOD CELLS URINE: 1 /HPF

## 2022-03-07 ENCOUNTER — TRANSCRIPTION ENCOUNTER (OUTPATIENT)
Age: 72
End: 2022-03-07

## 2022-03-29 RX ORDER — GABAPENTIN 300 MG/1
300 CAPSULE ORAL
Qty: 90 | Refills: 3 | Status: ACTIVE | COMMUNITY
Start: 2021-05-19 | End: 1900-01-01

## 2022-05-24 NOTE — ED STATDOCS - CHPI ED AGGRAVATING FACTORS
Admission Reconciliation is Completed  Discharge Reconciliation is Not Complete PALPATION Admission Reconciliation is Completed  Discharge Reconciliation is Completed

## 2022-06-13 ENCOUNTER — FORM ENCOUNTER (OUTPATIENT)
Age: 72
End: 2022-06-13

## 2022-07-01 ENCOUNTER — APPOINTMENT (OUTPATIENT)
Dept: FAMILY MEDICINE | Facility: CLINIC | Age: 72
End: 2022-07-01

## 2022-07-01 VITALS
BODY MASS INDEX: 30.62 KG/M2 | WEIGHT: 202 LBS | HEIGHT: 68 IN | OXYGEN SATURATION: 98 % | HEART RATE: 85 BPM | SYSTOLIC BLOOD PRESSURE: 152 MMHG | TEMPERATURE: 97 F | DIASTOLIC BLOOD PRESSURE: 62 MMHG

## 2022-07-01 VITALS — DIASTOLIC BLOOD PRESSURE: 68 MMHG | SYSTOLIC BLOOD PRESSURE: 138 MMHG

## 2022-07-01 DIAGNOSIS — M25.50 PAIN IN UNSPECIFIED JOINT: ICD-10-CM

## 2022-07-01 DIAGNOSIS — H53.2 DIPLOPIA: ICD-10-CM

## 2022-07-01 PROCEDURE — 36415 COLL VENOUS BLD VENIPUNCTURE: CPT

## 2022-07-01 PROCEDURE — 99214 OFFICE O/P EST MOD 30 MIN: CPT | Mod: 25

## 2022-07-01 PROCEDURE — 99000 SPECIMEN HANDLING OFFICE-LAB: CPT

## 2022-07-01 RX ORDER — OMEPRAZOLE 20 MG/1
20 TABLET, DELAYED RELEASE ORAL
Qty: 90 | Refills: 0 | Status: DISCONTINUED | COMMUNITY
End: 2022-07-01

## 2022-07-01 NOTE — PLAN
[FreeTextEntry1] : Continue all medications as prescribed. Check labs as above.\par \par Reviewed age-appropriate preventive screening tests with patient. \par \par Discussed clean eating (e.g. Mediterranean style diet) and recommendations for regular exercise/staying as physically active as possible.\par \par Reviewed importance of good self care (e.g. meditation, yoga, adequate rest, regular exercise, magnesium, clean eating, etc.).\par \par Follow up for next physical in 9/2022 as scheduled.

## 2022-07-01 NOTE — ASSESSMENT
[FreeTextEntry1] : He is here to follow up on hypertension, hypercholesterolemia, impaired fasting glucose, and BPH. He has been working on diet and exercise and is here to repeat his labs.\par \par His ophthalmologist ordered labs for his double vision. He would also like labs for his joint pain. Will check all labs as below and fax to Dr. Torres (neuroophthalmology) at [phone].

## 2022-07-01 NOTE — HISTORY OF PRESENT ILLNESS
[FreeTextEntry1] : CHATO BAEZ is a 71 year old male here for a physical exam.  [de-identified] : He has a history of hypertension, hypercholesterolemia, impaired fasting glucose, and BPH. He has labs done in 3/2022 which revealed a higher fasting glucose and HgbA1c than previously. His cholesterol was higher as well, mainly due to elevated triglycerides. He was advised to cut back on sugar and carbohydrates in his diet, work harder on exercise, and return in 3-4 months to repeat his labs.\par \par He complains of chronic joint pain and would like to have blood work to test for autoimmune disease.\par \par He has also developed visual problems including both blurry vision and double vision. He saw his ophthalmologist who ordered a number of blood tests and an MRI. He would like to have the labs checked today.

## 2022-07-01 NOTE — REVIEW OF SYSTEMS
[Back Pain] : back pain [Negative] : Heme/Lymph [Joint Pain] : joint pain [FreeTextEntry3] : double vision, blurry vision

## 2022-07-02 ENCOUNTER — TRANSCRIPTION ENCOUNTER (OUTPATIENT)
Age: 72
End: 2022-07-02

## 2022-07-02 LAB
ALBUMIN SERPL ELPH-MCNC: 4.5 G/DL
ALP BLD-CCNC: 58 U/L
ALT SERPL-CCNC: 22 U/L
ANION GAP SERPL CALC-SCNC: 15 MMOL/L
AST SERPL-CCNC: 19 U/L
BASOPHILS # BLD AUTO: 0.08 K/UL
BASOPHILS NFR BLD AUTO: 1.1 %
BILIRUB SERPL-MCNC: 0.5 MG/DL
BUN SERPL-MCNC: 21 MG/DL
CALCIUM SERPL-MCNC: 10.3 MG/DL
CHLORIDE SERPL-SCNC: 103 MMOL/L
CHOLEST SERPL-MCNC: 192 MG/DL
CO2 SERPL-SCNC: 25 MMOL/L
CREAT SERPL-MCNC: 0.85 MG/DL
CRP SERPL-MCNC: <3 MG/L
EGFR: 94 ML/MIN/1.73M2
EOSINOPHIL # BLD AUTO: 0.1 K/UL
EOSINOPHIL NFR BLD AUTO: 1.4 %
ERYTHROCYTE [SEDIMENTATION RATE] IN BLOOD BY WESTERGREN METHOD: 25 MM/HR
ESTIMATED AVERAGE GLUCOSE: 128 MG/DL
GLUCOSE SERPL-MCNC: 126 MG/DL
HBA1C MFR BLD HPLC: 6.1 %
HCT VFR BLD CALC: 42.7 %
HDLC SERPL-MCNC: 54 MG/DL
HGB BLD-MCNC: 14.2 G/DL
IMM GRANULOCYTES NFR BLD AUTO: 0.5 %
LDLC SERPL CALC-MCNC: 109 MG/DL
LYMPHOCYTES # BLD AUTO: 2.2 K/UL
LYMPHOCYTES NFR BLD AUTO: 30.1 %
MAN DIFF?: NORMAL
MCHC RBC-ENTMCNC: 32.5 PG
MCHC RBC-ENTMCNC: 33.3 GM/DL
MCV RBC AUTO: 97.7 FL
MONOCYTES # BLD AUTO: 0.58 K/UL
MONOCYTES NFR BLD AUTO: 7.9 %
NEUTROPHILS # BLD AUTO: 4.31 K/UL
NEUTROPHILS NFR BLD AUTO: 59 %
NONHDLC SERPL-MCNC: 138 MG/DL
PLATELET # BLD AUTO: 240 K/UL
POTASSIUM SERPL-SCNC: 3.8 MMOL/L
PROT SERPL-MCNC: 7.2 G/DL
PSA SERPL-MCNC: 1.74 NG/ML
RBC # BLD: 4.37 M/UL
RBC # FLD: 12.7 %
RHEUMATOID FACT SER QL: <10 IU/ML
SODIUM SERPL-SCNC: 142 MMOL/L
T3FREE SERPL-MCNC: 3.02 PG/ML
T4 FREE SERPL-MCNC: 1.2 NG/DL
TRIGL SERPL-MCNC: 149 MG/DL
TSH SERPL-ACNC: 1.29 UIU/ML
WBC # FLD AUTO: 7.31 K/UL

## 2022-07-02 RX ORDER — AMOXICILLIN AND CLAVULANATE POTASSIUM 875; 125 MG/1; MG/1
875-125 TABLET, COATED ORAL
Qty: 20 | Refills: 0 | Status: DISCONTINUED | COMMUNITY
Start: 2021-12-02 | End: 2022-07-02

## 2022-07-03 LAB
THYROGLOB AB SERPL-ACNC: <20 IU/ML
THYROPEROXIDASE AB SERPL IA-ACNC: <10 IU/ML

## 2022-07-05 LAB
ACE BLD-CCNC: 29 U/L
ACRM BINDING ANTIBODY: <0.03 NMOL/L
ANACR T: NEGATIVE

## 2022-07-06 LAB — ACHR BLOCK AB SER QL: 22 %

## 2022-07-09 ENCOUNTER — TRANSCRIPTION ENCOUNTER (OUTPATIENT)
Age: 72
End: 2022-07-09

## 2022-07-09 LAB — MUSK ANTIBODY TEST: NORMAL

## 2022-09-01 ENCOUNTER — APPOINTMENT (OUTPATIENT)
Dept: FAMILY MEDICINE | Facility: CLINIC | Age: 72
End: 2022-09-01

## 2022-09-01 VITALS
HEART RATE: 75 BPM | SYSTOLIC BLOOD PRESSURE: 108 MMHG | WEIGHT: 200 LBS | TEMPERATURE: 97 F | HEIGHT: 68 IN | BODY MASS INDEX: 30.31 KG/M2 | OXYGEN SATURATION: 97 % | DIASTOLIC BLOOD PRESSURE: 64 MMHG

## 2022-09-01 DIAGNOSIS — Z23 ENCOUNTER FOR IMMUNIZATION: ICD-10-CM

## 2022-09-01 PROCEDURE — 90686 IIV4 VACC NO PRSV 0.5 ML IM: CPT

## 2022-09-01 PROCEDURE — G0008: CPT

## 2022-09-01 PROCEDURE — 99397 PER PM REEVAL EST PAT 65+ YR: CPT | Mod: 25

## 2022-09-01 NOTE — PLAN
[FreeTextEntry1] : Continue all medications as prescribed. It is too soon to recheck his HgbA1c so he will return for labs in October.\par \par Reviewed age-appropriate preventive screening tests with patient. He is due for a colonoscopy and agreed to call Dr. Pugh to schedule. He is due for Shingrix and agreed to return for this when he is not working the next day.\par \par Discussed clean eating (e.g. Mediterranean style diet) and recommendations for regular exercise/staying as physically active as possible.\par \par Reviewed importance of good self care (e.g. meditation, yoga, adequate rest, regular exercise,\par magnesium, clean eating, etc.).\par \par Follow up for next physical in one year.\par

## 2022-09-01 NOTE — HISTORY OF PRESENT ILLNESS
[FreeTextEntry1] : CHATO BAEZ is a 71 year old male here for a physical exam.\par  [de-identified] : His last physical exam was last year\par \par Vaccines:\par Tetanus is up to date; last 12/2017\par Pneumococcal vaccination is up to date\par Shingrix is NOT up to date\par COVID vaccine is up to date\par \par His last dentist visit was less than one year ago\par His last eye doctor appointment was less than one year ago\par His last dermatologist visit was less than one year ago\par \par Colon cancer screening is NOT up to date; last colonoscopy was 4/17/2014 by Dr. Mcguire in [place]\par \par His diet is healthy overall\par Exercise: walking

## 2022-09-01 NOTE — HEALTH RISK ASSESSMENT
[No falls in past year] : Patient reported no falls in the past year [0] : 2) Feeling down, depressed, or hopeless: Not at all (0) [PHQ-2 Negative - No further assessment needed] : PHQ-2 Negative - No further assessment needed [[id number]] : 0

## 2022-09-01 NOTE — ASSESSMENT
[FreeTextEntry1] : CHATO BAEZ is a 71 year old male here for a physical exam.\par \par He has a history of hypertension, hypercholesterolemia, impaired fasting glucose, and BPH. His cardiologist started him on Labetalol and his blood pressure is significantly lower.\par \par He has labs done in 3/2022 which revealed a higher fasting glucose and HgbA1c than previously. His cholesterol was higher as well, mainly due to elevated triglycerides. He was advised to cut back on sugar and carbohydrates in his diet, work harder on exercise, and return in 3-4 months to repeat his labs. He returned on 7/1/22 and his labs were all stable. He also had a number of autoimmune blood tests ordered by his ophthalmologist due to new onset double vision. All labs were normal except for a mildly elevated ESR of 25.\par \par He still has double vision. He is able to drive and do all of his other activities. The symptoms are mildly bothersome. The ophthalmologist he was seeing recommended surgery. He is going to [facility] for a second opinion. He has an appointment in October.\par \par He sees a cardiologist (Dr. Arcos) regularly and does not need an EKG today. \par

## 2022-09-14 ENCOUNTER — FORM ENCOUNTER (OUTPATIENT)
Age: 72
End: 2022-09-14

## 2022-10-03 ENCOUNTER — APPOINTMENT (OUTPATIENT)
Dept: FAMILY MEDICINE | Facility: CLINIC | Age: 72
End: 2022-10-03

## 2022-10-28 ENCOUNTER — NON-APPOINTMENT (OUTPATIENT)
Age: 72
End: 2022-10-28

## 2022-11-16 ENCOUNTER — APPOINTMENT (OUTPATIENT)
Dept: FAMILY MEDICINE | Facility: CLINIC | Age: 72
End: 2022-11-16

## 2022-11-16 ENCOUNTER — RESULT CHARGE (OUTPATIENT)
Age: 72
End: 2022-11-16

## 2022-11-16 VITALS
DIASTOLIC BLOOD PRESSURE: 76 MMHG | SYSTOLIC BLOOD PRESSURE: 128 MMHG | TEMPERATURE: 97.2 F | HEIGHT: 68 IN | OXYGEN SATURATION: 96 % | WEIGHT: 200 LBS | HEART RATE: 79 BPM | BODY MASS INDEX: 30.31 KG/M2

## 2022-11-16 DIAGNOSIS — R10.32 LEFT LOWER QUADRANT PAIN: ICD-10-CM

## 2022-11-16 LAB
BILIRUB UR QL STRIP: NEGATIVE
GLUCOSE UR-MCNC: NEGATIVE
HCG UR QL: 0.2 EU/DL
HGB UR QL STRIP.AUTO: NEGATIVE
KETONES UR-MCNC: NEGATIVE
LEUKOCYTE ESTERASE UR QL STRIP: NEGATIVE
NITRITE UR QL STRIP: NEGATIVE
PH UR STRIP: 6
PROT UR STRIP-MCNC: 100
SP GR UR STRIP: 1.03

## 2022-11-16 PROCEDURE — 99213 OFFICE O/P EST LOW 20 MIN: CPT | Mod: 25

## 2022-11-16 PROCEDURE — 81003 URINALYSIS AUTO W/O SCOPE: CPT | Mod: QW

## 2022-11-16 PROCEDURE — 36415 COLL VENOUS BLD VENIPUNCTURE: CPT

## 2022-11-16 RX ORDER — LABETALOL HYDROCHLORIDE 100 MG/1
100 TABLET, FILM COATED ORAL
Qty: 60 | Refills: 0 | Status: ACTIVE | COMMUNITY
Start: 2022-05-09

## 2022-11-16 RX ORDER — TADALAFIL 5 MG/1
5 TABLET, FILM COATED ORAL
Refills: 0 | Status: DISCONTINUED | COMMUNITY
End: 2022-11-16

## 2022-11-16 NOTE — ASSESSMENT
[FreeTextEntry1] : Patient is a 71yo male presenting to the office complaining of left testicular pain and swelling x2-3 weeks, initially constant now intermittent.\par \par Left inguinal/testicular pain\par - UA in office normal.\par - Scrotal US ordered.\par - Pt fasting for b/w that was due last month ordered by Dr. DAMARIS Lujan. Will add PSA.\par - F/u Urology, Dr. Lopez.   to assist with appointment.\par - Make sure you are staying well hydrated, drinking plenty of water.\par - Call the office or go to the ED immediately if you develop new, worsening or concerning symptoms including high fever, severe headache/worst headache of your life, confusion, dizziness/lightheadedness, loss of consciousness, severe chest pain, difficulty breathing, shortness of breath, severe abdominal pain, excessive vomiting/diarrhea, inability to feel/move the extremities, or any other concerning symptoms.\par

## 2022-11-16 NOTE — HISTORY OF PRESENT ILLNESS
[FreeTextEntry8] : Patient is a 71yo male presenting to the office complaining of left groin/testicular pain.\par Pt reports discomfort in the left groin/testicle for the past 2-3 weeks.\par Pain was initially constant, has improved and intermittent based on movement.\par Reports mild hematospermia.\par Denies hematuria or dysuria.  Denies urinary frequency.\par Denies abdominal pain, N/V/D.\par Denies blood in stool or dark/tarry stools.\par Denies fevers.\par \par Urology:  Dr. Lopez.  Last OV in 2018.\par Has history of nephrolithiasis, states this feels different, does not typically get pain in his lower abdomen/groin/testicle with kidney stones.

## 2022-11-16 NOTE — PHYSICAL EXAM
[No JVD] : no jugular venous distention [No Respiratory Distress] : no respiratory distress  [No Edema] : there was no peripheral edema [Normal] : no rash [Coordination Grossly Intact] : coordination grossly intact [No Focal Deficits] : no focal deficits [Normal Gait] : normal gait [Normal Affect] : the affect was normal [Normal Insight/Judgement] : insight and judgment were intact [de-identified] : no gross deformities or swelling of the scrotum, non-tender on exam, no palpable masses, no palpable hernia

## 2022-11-16 NOTE — REVIEW OF SYSTEMS
[Negative] : Heme/Lymph [Dysuria] : no dysuria [Incontinence] : no incontinence [Hesitancy] : no hesitancy [Nocturia] : no nocturia [Hematuria] : no hematuria [Frequency] : no frequency [FreeTextEntry8] : left testicular pain/swelling intermittent; hematospermia

## 2022-11-17 LAB
ESTIMATED AVERAGE GLUCOSE: 126 MG/DL
HBA1C MFR BLD HPLC: 6 %

## 2022-11-18 LAB — PSA SERPL-MCNC: 1.56 NG/ML

## 2022-11-20 ENCOUNTER — TRANSCRIPTION ENCOUNTER (OUTPATIENT)
Age: 72
End: 2022-11-20

## 2022-11-20 LAB
ALBUMIN SERPL ELPH-MCNC: 4.9 G/DL
ALP BLD-CCNC: 66 U/L
ALT SERPL-CCNC: 25 U/L
ANION GAP SERPL CALC-SCNC: 21 MMOL/L
AST SERPL-CCNC: 20 U/L
BILIRUB SERPL-MCNC: 0.6 MG/DL
BUN SERPL-MCNC: 22 MG/DL
CALCIUM SERPL-MCNC: 10.4 MG/DL
CHLORIDE SERPL-SCNC: 99 MMOL/L
CHOLEST SERPL-MCNC: 216 MG/DL
CO2 SERPL-SCNC: 21 MMOL/L
CREAT SERPL-MCNC: 0.91 MG/DL
EGFR: 91 ML/MIN/1.73M2
GLUCOSE SERPL-MCNC: 90 MG/DL
HDLC SERPL-MCNC: 55 MG/DL
LDLC SERPL CALC-MCNC: 109 MG/DL
NONHDLC SERPL-MCNC: 161 MG/DL
POTASSIUM SERPL-SCNC: 3.9 MMOL/L
PROT SERPL-MCNC: 7.9 G/DL
SODIUM SERPL-SCNC: 141 MMOL/L
TRIGL SERPL-MCNC: 258 MG/DL

## 2022-11-22 ENCOUNTER — APPOINTMENT (OUTPATIENT)
Dept: ULTRASOUND IMAGING | Facility: CLINIC | Age: 72
End: 2022-11-22
Payer: COMMERCIAL

## 2022-11-22 PROCEDURE — 93975 VASCULAR STUDY: CPT

## 2022-11-22 PROCEDURE — 76870 US EXAM SCROTUM: CPT

## 2022-11-23 ENCOUNTER — APPOINTMENT (OUTPATIENT)
Dept: UROLOGY | Facility: CLINIC | Age: 72
End: 2022-11-23

## 2022-11-23 VITALS
SYSTOLIC BLOOD PRESSURE: 149 MMHG | BODY MASS INDEX: 30.31 KG/M2 | HEIGHT: 68 IN | HEART RATE: 74 BPM | WEIGHT: 200 LBS | DIASTOLIC BLOOD PRESSURE: 88 MMHG | RESPIRATION RATE: 17 BRPM | TEMPERATURE: 97.6 F

## 2022-11-23 DIAGNOSIS — N50.812 LEFT TESTICULAR PAIN: ICD-10-CM

## 2022-11-23 PROCEDURE — 99204 OFFICE O/P NEW MOD 45 MIN: CPT

## 2022-11-25 LAB — BACTERIA UR CULT: NORMAL

## 2022-11-28 ENCOUNTER — NON-APPOINTMENT (OUTPATIENT)
Age: 72
End: 2022-11-28

## 2022-11-29 ENCOUNTER — APPOINTMENT (OUTPATIENT)
Dept: CT IMAGING | Facility: CLINIC | Age: 72
End: 2022-11-29
Payer: COMMERCIAL

## 2022-11-29 ENCOUNTER — OUTPATIENT (OUTPATIENT)
Dept: OUTPATIENT SERVICES | Facility: HOSPITAL | Age: 72
LOS: 1 days | End: 2022-11-29
Payer: COMMERCIAL

## 2022-11-29 DIAGNOSIS — N21.0 CALCULUS IN BLADDER: ICD-10-CM

## 2022-11-29 PROCEDURE — 74176 CT ABD & PELVIS W/O CONTRAST: CPT

## 2022-11-29 PROCEDURE — 74176 CT ABD & PELVIS W/O CONTRAST: CPT | Mod: 26,MH

## 2022-12-14 ENCOUNTER — APPOINTMENT (OUTPATIENT)
Dept: UROLOGY | Facility: CLINIC | Age: 72
End: 2022-12-14

## 2022-12-14 ENCOUNTER — TRANSCRIPTION ENCOUNTER (OUTPATIENT)
Age: 72
End: 2022-12-14

## 2022-12-14 NOTE — HISTORY OF PRESENT ILLNESS
[None] : None [FreeTextEntry1] : 72 yr old man presents to re-establish care. Pt was last seen 2018. Pt complaining of left testicular pain since the last week of October. Pt went to PCP, who ordered a testicular US. Pt has a history of kidney stones and bladder stones.  \par \par Surgical hx: 11/2017- cystoscopy, ureterocele, cystolitholapaxy, left retrograde pyelogram and left stent placement, also pt states previous kidney stone surgery with Dr. Zhang\par Medical hx: HTN, HLD, kidney stones\par Allergies: none\par Social: Alcohol- daily 1.5 drinks of vodka, Smoking- none, Drug- none, Occupation- retired- financial \par Family hx: noncontributory \par Medications: reviewed \par \par PSA\par 1.56- 11/2022\par 1.74- 07/2022\par 1.74 -03/2022\par 1.78- 05/2021\par \par \par Scrotal ultrasound images and report reviewed

## 2022-12-14 NOTE — PHYSICAL EXAM
[General Appearance - In No Acute Distress] : no acute distress [Edema] : no peripheral edema [] : no respiratory distress [Abdomen Soft] : soft [Abdomen Tenderness] : non-tender [Urethral Meatus] : meatus normal [Penis Abnormality] : normal circumcised penis [Scrotum] : the scrotum was normal [Rectal Exam - Seminal Vesicles] : the seminal vesicles were normal [Epididymis] : the epididymides were normal [Testes Mass (___cm)] : there were no testicular masses [Normal Station and Gait] : the gait and station were normal for the patient's age [No Focal Deficits] : no focal deficits [Oriented To Time, Place, And Person] : oriented to person, place, and time [Urinary Bladder Findings] : the bladder was normal on palpation [Skin Color & Pigmentation] : normal skin color and pigmentation [Affect] : the affect was normal [Mood] : the mood was normal [Not Anxious] : not anxious [Inguinal Lymph Nodes Enlarged Bilaterally] : inguinal [FreeTextEntry1] : mild testicular tenderness bilaterally

## 2022-12-14 NOTE — ASSESSMENT
[FreeTextEntry1] : \par history of stones now with testicular pain. testicular US showed left varicocele and small right hydrocele. urine dipstick negative. \par \par CT stone hunt to rule out ureteral stone causing referred pain to the left testicle\par Urine culture sent\par \par advised to take tylenol or ibuprofen for pain as needed\par \par See me in 3 weeks to review results

## 2022-12-20 ENCOUNTER — TRANSCRIPTION ENCOUNTER (OUTPATIENT)
Age: 72
End: 2022-12-20

## 2022-12-28 NOTE — HISTORY OF PRESENT ILLNESS
[FreeTextEntry1] : In person appointment scheduled for this morning\par Patient's.  He was under the impression that this was a telehealth appointment.\par Appointment was canceled and rescheduled for this afternoon as a telehealth appointment.\par \par Called patient's home number and cell phone number--- all numbers went to voicemail each time.\par Cell phone number called 3 times.  Left voice message regarding CT results

## 2023-01-03 ENCOUNTER — TRANSCRIPTION ENCOUNTER (OUTPATIENT)
Age: 73
End: 2023-01-03

## 2023-01-04 ENCOUNTER — TRANSCRIPTION ENCOUNTER (OUTPATIENT)
Age: 73
End: 2023-01-04

## 2023-01-06 ENCOUNTER — TRANSCRIPTION ENCOUNTER (OUTPATIENT)
Age: 73
End: 2023-01-06

## 2023-01-11 ENCOUNTER — APPOINTMENT (OUTPATIENT)
Dept: UROLOGY | Facility: CLINIC | Age: 73
End: 2023-01-11
Payer: COMMERCIAL

## 2023-01-11 VITALS
WEIGHT: 202 LBS | HEART RATE: 73 BPM | TEMPERATURE: 98 F | HEIGHT: 68 IN | RESPIRATION RATE: 17 BRPM | DIASTOLIC BLOOD PRESSURE: 72 MMHG | BODY MASS INDEX: 30.62 KG/M2 | SYSTOLIC BLOOD PRESSURE: 122 MMHG

## 2023-01-11 DIAGNOSIS — N20.0 CALCULUS OF KIDNEY: ICD-10-CM

## 2023-01-11 DIAGNOSIS — N52.9 MALE ERECTILE DYSFUNCTION, UNSPECIFIED: ICD-10-CM

## 2023-01-11 PROCEDURE — 99213 OFFICE O/P EST LOW 20 MIN: CPT

## 2023-01-11 RX ORDER — SILDENAFIL 100 MG/1
100 TABLET, FILM COATED ORAL
Qty: 18 | Refills: 3 | Status: ACTIVE | COMMUNITY
Start: 2021-05-19 | End: 1900-01-01

## 2023-01-11 NOTE — HISTORY OF PRESENT ILLNESS
[FreeTextEntry1] : Pt is here for follow up\par he is here to review CT scan results\par \par CT images reviewed, all pertinent findings discussed with patient

## 2023-01-11 NOTE — REVIEW OF SYSTEMS
[Hesitancy] : urinary hesitancy [Nocturia] : nocturia [Arthralgias] : arthralgias [Negative] : Heme/Lymph

## 2023-01-30 ENCOUNTER — FORM ENCOUNTER (OUTPATIENT)
Age: 73
End: 2023-01-30

## 2023-02-06 ENCOUNTER — FORM ENCOUNTER (OUTPATIENT)
Age: 73
End: 2023-02-06

## 2023-02-07 ENCOUNTER — FORM ENCOUNTER (OUTPATIENT)
Age: 73
End: 2023-02-07

## 2023-02-11 ENCOUNTER — FORM ENCOUNTER (OUTPATIENT)
Age: 73
End: 2023-02-11

## 2023-02-11 ENCOUNTER — NON-APPOINTMENT (OUTPATIENT)
Age: 73
End: 2023-02-11

## 2023-02-22 ENCOUNTER — APPOINTMENT (OUTPATIENT)
Dept: FAMILY MEDICINE | Facility: CLINIC | Age: 73
End: 2023-02-22
Payer: COMMERCIAL

## 2023-02-22 VITALS
TEMPERATURE: 97.9 F | WEIGHT: 203 LBS | OXYGEN SATURATION: 98 % | SYSTOLIC BLOOD PRESSURE: 134 MMHG | HEIGHT: 68 IN | HEART RATE: 81 BPM | DIASTOLIC BLOOD PRESSURE: 80 MMHG | BODY MASS INDEX: 30.77 KG/M2

## 2023-02-22 DIAGNOSIS — J01.01 ACUTE RECURRENT MAXILLARY SINUSITIS: ICD-10-CM

## 2023-02-22 PROCEDURE — 99213 OFFICE O/P EST LOW 20 MIN: CPT

## 2023-02-22 RX ORDER — FLUTICASONE PROPIONATE 50 UG/1
50 SPRAY, METERED NASAL DAILY
Qty: 1 | Refills: 1 | Status: ACTIVE | COMMUNITY
Start: 2023-02-22 | End: 1900-01-01

## 2023-02-22 NOTE — REVIEW OF SYSTEMS
[Postnasal Drip] : postnasal drip [Nasal Discharge] : nasal discharge [Sore Throat] : sore throat [Shortness Of Breath] : no shortness of breath [Wheezing] : no wheezing [Cough] : cough [Headache] : no headache [Dizziness] : dizziness [Negative] : Heme/Lymph

## 2023-02-22 NOTE — ASSESSMENT
[FreeTextEntry1] : Patient is a 71yo male who presents to the office with signs/symptoms of acute sinusitis despite treatment on 2/12/23.  RX for Doxycycline 100mg BID x10 days sent to pharmacy.  Pt encouraged to continue Flonse and supportive care.  Pt also encouraged to f/u with ENT, referral list provided (Dr. Morales/Eliceo).\par \par Call the office or go to the ED immediately if you develop new, worsening or concerning symptoms including high fever, severe headache/worst headache of your life, confusion, dizziness/lightheadedness, loss of consciousness, severe chest pain, difficulty breathing, shortness of breath, severe abdominal pain, excessive vomiting/diarrhea, inability to feel/move the extremities, or any other concerning symptoms.\par

## 2023-02-22 NOTE — PHYSICAL EXAM
[No Edema] : there was no peripheral edema [Normal] : no rash [Coordination Grossly Intact] : coordination grossly intact [No Focal Deficits] : no focal deficits [Normal Gait] : normal gait [Normal Affect] : the affect was normal [Normal Insight/Judgement] : insight and judgment were intact [de-identified] : effusions bilateral TMs; nasal mucosa edematous/erythematous with copious cloudy drainage; PND, mild erythema, no exudates.

## 2023-02-22 NOTE — HISTORY OF PRESENT ILLNESS
[FreeTextEntry8] : Patient is a 73yo male who presents to the office complaining of persistent sinusitis.\par Pt states there was no heat at his job since the beginning of February.  Temporary heaters were placed and has been sick ever since.\par Pt reports sinus pressure, PND, mild cough.\par Pt seen at  2/12/23, prescribed Augmentin x7 days, Prednisone 40mg x5 days, Flonase, Azelastine and Mucinex without improvement.\par States he is having mild vertigo symptoms with sinus pressure.\par Has seen ENT in the past (Dr. Jha).  Sinus surgery was planned prior to COVID.\par Denies fevers.

## 2023-03-27 NOTE — ED ADULT NURSE NOTE - DISCHARGE DATE/TIME
Physical Therapy Visit    Visit Type: Daily Treatment Note  Visit: 5  Referring Provider: Chyna Green DO  Medical Diagnosis (from order): Diagnosis Information    Diagnosis  724.2, 338.29 (ICD-9-CM) - M54.50, G89.29 (ICD-10-CM) - Chronic bilateral low back pain, unspecified whether sciatica present  719.45 (ICD-9-CM) - M25.559 (ICD-10-CM) - Hip pain         SUBJECTIVE                                                                                                               Patient reports she has not been compliant with her HEP - she has been very busy. She states she has been going to [facility] though. Still has trouble lifting the left leg up in shoulder to wash toes and put on her shoes in sitting.       OBJECTIVE                                                                                                                                       Treatment     Manual Therapy   Soft tissue mobilization left glut/piriformis in right sidelying      Therapeutic Activity  Sit to stands with elevated table and slight use of hands on thighs for eccentric control/pushoff 2x10    Education on sit to stands and foot placement/safety     Step ups 10x bilateral, 6\" with cueing/hand hold on //bars     Skilled input: verbal instruction/cues and tactile instruction/cues    Writer verbally educated and received verbal consent for hand placement, positioning of patient, and techniques to be performed today from patient for hand placement and palpation for techniques as described above and how they are pertinent to the patient's plan of care.    Home Exercise Program  Access Code: [email or web address]  URL: [email or web address]  Date: 03/27/2023  Prepared by: Latesha Morales    Exercises  - Supine Sciatic Nerve Glide  - 1 x daily - 10 reps  - Seated Hamstring Stretch  - 1 x daily - 2 reps - 30 seconds hold  - Standing Hip Abduction  - 1 x daily - 4 x weekly - 10 reps  - Standing Hip Extension  - 1 x daily - 4 x  weekly - 10 reps  - Sit to Stand  - 1 x daily - 4 x weekly - 2 sets - 10 reps  - Forward Step Up  - 1 x daily - 4 x weekly - 10 reps        ASSESSMENT                                                                                                            Patient challenged with new exercises but no pain noted. Overall fatigue noted but able to perform session with breaks. Patient requires more skilled PT to address LE strength to perform steps with less difficulty.  Education:   - Results of above outlined education: Verbalizes understanding and Demonstrates understanding    PLAN                                                                                                                           Suggestions for next session as indicated: Progress per plan of care, add in balance tasks, piriformis stretch, gastroc stretch       Therapy procedure time and total treatment time can be found documented on the Time Entry flowsheet     22-Apr-2018 23:19

## 2023-04-06 ENCOUNTER — APPOINTMENT (OUTPATIENT)
Dept: OTOLARYNGOLOGY | Facility: CLINIC | Age: 73
End: 2023-04-06
Payer: COMMERCIAL

## 2023-04-06 VITALS — TEMPERATURE: 96.8 F | HEIGHT: 68 IN | BODY MASS INDEX: 30.31 KG/M2 | WEIGHT: 200 LBS

## 2023-04-06 DIAGNOSIS — K21.9 GASTRO-ESOPHAGEAL REFLUX DISEASE W/OUT ESOPHAGITIS: ICD-10-CM

## 2023-04-06 DIAGNOSIS — J34.3 HYPERTROPHY OF NASAL TURBINATES: ICD-10-CM

## 2023-04-06 DIAGNOSIS — J32.2 CHRONIC ETHMOIDAL SINUSITIS: ICD-10-CM

## 2023-04-06 DIAGNOSIS — R09.82 POSTNASAL DRIP: ICD-10-CM

## 2023-04-06 DIAGNOSIS — J34.2 DEVIATED NASAL SEPTUM: ICD-10-CM

## 2023-04-06 PROCEDURE — 31231 NASAL ENDOSCOPY DX: CPT

## 2023-04-06 PROCEDURE — 99204 OFFICE O/P NEW MOD 45 MIN: CPT | Mod: 25

## 2023-04-06 RX ORDER — AZELASTINE HYDROCHLORIDE 137 UG/1
0.1 SPRAY, METERED NASAL TWICE DAILY
Qty: 1 | Refills: 5 | Status: ACTIVE | COMMUNITY
Start: 2023-04-06 | End: 1900-01-01

## 2023-04-06 NOTE — ASSESSMENT
[FreeTextEntry1] : Exam reveals significant nasal obstruction related nasal septal deviation, hypertropic inferior nasal turbinates\par and allergic or nonspecific rhinitis.\par no signs sinusitis now\par pred 10 #20\par azelastine spray\par consider ct and septoplasty\par

## 2023-04-06 NOTE — HISTORY OF PRESENT ILLNESS
[de-identified] : co longstanding nasal congestion and hx dev septum\par sinusitis 2 mo ago\par co constant pnd and throat clearing\par night time nasal obstruction,neg allergies\par urgent care rx antibiotics-doxycycline and second course, flonase\par sinusitis 1 x year in past\par gerd using omeprazole

## 2023-04-06 NOTE — PROCEDURE
[FreeTextEntry6] : Indication for procedure:  Unable to adequately examine mid and posterior nasal cavity with anterior rhinoscopy\par The patient has\par \par Sinus endoscope # \par Nasal septum exam shows septal deviation to the\par The inferior nasal turbinates are moderately hypertrophic in size bilaterally.\par The sinus endoscope was introduced into the right nares\par exam right middle meatus reveals no mucopus or inflammation.  The right middle turbinate is WNL.\par The scope was advanced and the sphenoethmoid region was inspected.\par The superior meatus, superior turbinate and nasal vault are unremarkable.  The nasopharynx is unremarkable without inflammation or mass.\par The sinus endoscope was introduced into the left nares and\par exam left middle meatus reveals no mucopus or inflammation.  The left middle turbinate is WNL.\par The scope was advanced and the sphenoethmoid region was inspected.\par The left superior meatus and nasal vault are unremarkable.\par The fiberoptic scope was introduced to the posterior pharynx and exam of the endolarynx is wnl w good vocal cord mobility.\par No lesion noted in hypopharynx.  There is no erythema or edema of the posterior larynx to suggest reflux.\par \par

## 2023-04-06 NOTE — REVIEW OF SYSTEMS
[Post Nasal Drip] : post nasal drip [Nasal Congestion] : nasal congestion [Recurrent Sinus Infections] : recurrent sinus infections [Sinus Pain] : sinus pain [Sinus Pressure] : sinus pressure [Discolored Nasal Discharge] : discolored nasal discharge [Throat Clearing] : throat clearing [Negative] : Heme/Lymph [Patient Intake Form Reviewed] : Patient intake form was reviewed

## 2023-04-06 NOTE — CONSULT LETTER
[Consult Letter:] : I had the pleasure of evaluating your patient, [unfilled]. [Please see my note below.] : Please see my note below. [Consult Closing:] : Thank you very much for allowing me to participate in the care of this patient.  If you have any questions, please do not hesitate to contact me. [Sincerely,] : Sincerely, [FreeTextEntry1] : Dear Dr. ZAHRA ALSTON,\par \par Thank you for your kind referral. Please refer to my enclosed office notes for CHATO BAEZ . If there are any questions free to contact me.\par  [FreeTextEntry3] : Bear Morales MD, FACS\par

## 2023-05-11 ENCOUNTER — RX RENEWAL (OUTPATIENT)
Age: 73
End: 2023-05-11

## 2023-06-02 ENCOUNTER — FORM ENCOUNTER (OUTPATIENT)
Age: 73
End: 2023-06-02

## 2023-06-02 ENCOUNTER — NON-APPOINTMENT (OUTPATIENT)
Age: 73
End: 2023-06-02

## 2023-06-04 ENCOUNTER — NON-APPOINTMENT (OUTPATIENT)
Age: 73
End: 2023-06-04

## 2023-06-04 ENCOUNTER — FORM ENCOUNTER (OUTPATIENT)
Age: 73
End: 2023-06-04

## 2023-06-06 ENCOUNTER — NON-APPOINTMENT (OUTPATIENT)
Age: 73
End: 2023-06-06

## 2023-07-02 ENCOUNTER — FORM ENCOUNTER (OUTPATIENT)
Age: 73
End: 2023-07-02

## 2023-07-27 ENCOUNTER — FORM ENCOUNTER (OUTPATIENT)
Age: 73
End: 2023-07-27

## 2023-08-01 NOTE — H&P PST ADULT - PMH
[Normal Breath Sounds] : Normal breath sounds [2+] : left 2+ [Varicose Veins Of Lower Extremities] : present [Varicose Veins Of The Right Leg] : of the right leg [Alert] : alert [Oriented to Person] : oriented to person [Oriented to Place] : oriented to place [Calm] : calm [de-identified] : NAD [de-identified] : NCAT [de-identified] : supple [de-identified] : Soft, nontender, nondistended [de-identified] : Right medial calf with varicose veins, small focus which are noncompressible, and mildly tender, and adjacent varicose veins are compressible and soft Calculus in bladder    Gastroesophageal reflux disease without esophagitis    Hyperlipidemia, unspecified hyperlipidemia type    Hypertension, unspecified type    Kidney cysts    Kidney stone    Obesity (BMI 30.0-34.9)    Shoulder disorder Benign prostatic hyperplasia with urinary frequency    Calculus in bladder    Gastroesophageal reflux disease without esophagitis    Hyperlipidemia, unspecified hyperlipidemia type    Hypertension, unspecified type    Kidney cysts    Kidney stone    Obesity (BMI 30.0-34.9)    Shoulder disorder

## 2023-09-10 ENCOUNTER — NON-APPOINTMENT (OUTPATIENT)
Age: 73
End: 2023-09-10

## 2023-09-29 ENCOUNTER — APPOINTMENT (OUTPATIENT)
Dept: OTOLARYNGOLOGY | Facility: CLINIC | Age: 73
End: 2023-09-29

## 2023-10-27 RX ORDER — OMEPRAZOLE 40 MG/1
40 CAPSULE, DELAYED RELEASE ORAL
Qty: 90 | Refills: 3 | Status: ACTIVE | COMMUNITY
Start: 2022-07-01 | End: 1900-01-01

## 2023-11-20 ENCOUNTER — RX RENEWAL (OUTPATIENT)
Age: 73
End: 2023-11-20

## 2024-01-03 ENCOUNTER — APPOINTMENT (OUTPATIENT)
Dept: UROLOGY | Facility: CLINIC | Age: 74
End: 2024-01-03

## 2024-01-10 ENCOUNTER — NON-APPOINTMENT (OUTPATIENT)
Age: 74
End: 2024-01-10

## 2024-02-12 ENCOUNTER — TRANSCRIPTION ENCOUNTER (OUTPATIENT)
Age: 74
End: 2024-02-12

## 2024-02-15 ENCOUNTER — APPOINTMENT (OUTPATIENT)
Dept: FAMILY MEDICINE | Facility: CLINIC | Age: 74
End: 2024-02-15
Payer: COMMERCIAL

## 2024-02-15 PROCEDURE — 36415 COLL VENOUS BLD VENIPUNCTURE: CPT

## 2024-02-16 ENCOUNTER — EMERGENCY (EMERGENCY)
Facility: HOSPITAL | Age: 74
LOS: 0 days | Discharge: HOME | End: 2024-02-16
Attending: EMERGENCY MEDICINE
Payer: COMMERCIAL

## 2024-02-16 VITALS
TEMPERATURE: 98 F | DIASTOLIC BLOOD PRESSURE: 70 MMHG | HEART RATE: 62 BPM | RESPIRATION RATE: 16 BRPM | SYSTOLIC BLOOD PRESSURE: 131 MMHG | OXYGEN SATURATION: 98 %

## 2024-02-16 VITALS — WEIGHT: 195.11 LBS | HEIGHT: 68 IN

## 2024-02-16 DIAGNOSIS — Z98.890 OTHER SPECIFIED POSTPROCEDURAL STATES: Chronic | ICD-10-CM

## 2024-02-16 DIAGNOSIS — I10 ESSENTIAL (PRIMARY) HYPERTENSION: ICD-10-CM

## 2024-02-16 DIAGNOSIS — D72.829 ELEVATED WHITE BLOOD CELL COUNT, UNSPECIFIED: ICD-10-CM

## 2024-02-16 DIAGNOSIS — R55 SYNCOPE AND COLLAPSE: ICD-10-CM

## 2024-02-16 LAB
ALBUMIN SERPL ELPH-MCNC: 3.6 G/DL — SIGNIFICANT CHANGE UP (ref 3.3–5)
ALP SERPL-CCNC: 60 U/L — SIGNIFICANT CHANGE UP (ref 40–120)
ALT FLD-CCNC: 28 U/L — SIGNIFICANT CHANGE UP (ref 12–78)
ANION GAP SERPL CALC-SCNC: 5 MMOL/L — SIGNIFICANT CHANGE UP (ref 5–17)
AST SERPL-CCNC: 14 U/L — LOW (ref 15–37)
BASOPHILS # BLD AUTO: 0.09 K/UL — SIGNIFICANT CHANGE UP (ref 0–0.2)
BASOPHILS NFR BLD AUTO: 0.8 % — SIGNIFICANT CHANGE UP (ref 0–2)
BILIRUB SERPL-MCNC: 0.6 MG/DL — SIGNIFICANT CHANGE UP (ref 0.2–1.2)
BUN SERPL-MCNC: 28 MG/DL — HIGH (ref 7–23)
CALCIUM SERPL-MCNC: 9.5 MG/DL — SIGNIFICANT CHANGE UP (ref 8.5–10.1)
CHLORIDE SERPL-SCNC: 105 MMOL/L — SIGNIFICANT CHANGE UP (ref 96–108)
CO2 SERPL-SCNC: 30 MMOL/L — SIGNIFICANT CHANGE UP (ref 22–31)
CREAT SERPL-MCNC: 1.04 MG/DL — SIGNIFICANT CHANGE UP (ref 0.5–1.3)
EGFR: 77 ML/MIN/1.73M2 — SIGNIFICANT CHANGE UP
EOSINOPHIL # BLD AUTO: 0.07 K/UL — SIGNIFICANT CHANGE UP (ref 0–0.5)
EOSINOPHIL NFR BLD AUTO: 0.6 % — SIGNIFICANT CHANGE UP (ref 0–6)
GLUCOSE SERPL-MCNC: 101 MG/DL — HIGH (ref 70–99)
HCT VFR BLD CALC: 39.8 % — SIGNIFICANT CHANGE UP (ref 39–50)
HGB BLD-MCNC: 14.2 G/DL — SIGNIFICANT CHANGE UP (ref 13–17)
IMM GRANULOCYTES NFR BLD AUTO: 1 % — HIGH (ref 0–0.9)
LYMPHOCYTES # BLD AUTO: 2.91 K/UL — SIGNIFICANT CHANGE UP (ref 1–3.3)
LYMPHOCYTES # BLD AUTO: 26.3 % — SIGNIFICANT CHANGE UP (ref 13–44)
MAGNESIUM SERPL-MCNC: 1.7 MG/DL — SIGNIFICANT CHANGE UP (ref 1.6–2.6)
MCHC RBC-ENTMCNC: 33.8 PG — SIGNIFICANT CHANGE UP (ref 27–34)
MCHC RBC-ENTMCNC: 35.7 GM/DL — SIGNIFICANT CHANGE UP (ref 32–36)
MCV RBC AUTO: 94.8 FL — SIGNIFICANT CHANGE UP (ref 80–100)
MONOCYTES # BLD AUTO: 1 K/UL — HIGH (ref 0–0.9)
MONOCYTES NFR BLD AUTO: 9 % — SIGNIFICANT CHANGE UP (ref 2–14)
NEUTROPHILS # BLD AUTO: 6.89 K/UL — SIGNIFICANT CHANGE UP (ref 1.8–7.4)
NEUTROPHILS NFR BLD AUTO: 62.3 % — SIGNIFICANT CHANGE UP (ref 43–77)
PLATELET # BLD AUTO: 249 K/UL — SIGNIFICANT CHANGE UP (ref 150–400)
POTASSIUM SERPL-MCNC: 3.1 MMOL/L — LOW (ref 3.5–5.3)
POTASSIUM SERPL-SCNC: 3.1 MMOL/L — LOW (ref 3.5–5.3)
PROT SERPL-MCNC: 7.3 GM/DL — SIGNIFICANT CHANGE UP (ref 6–8.3)
RBC # BLD: 4.2 M/UL — SIGNIFICANT CHANGE UP (ref 4.2–5.8)
RBC # FLD: 11.9 % — SIGNIFICANT CHANGE UP (ref 10.3–14.5)
SODIUM SERPL-SCNC: 140 MMOL/L — SIGNIFICANT CHANGE UP (ref 135–145)
TROPONIN I, HIGH SENSITIVITY RESULT: 9.54 NG/L — SIGNIFICANT CHANGE UP
WBC # BLD: 11.07 K/UL — HIGH (ref 3.8–10.5)
WBC # FLD AUTO: 11.07 K/UL — HIGH (ref 3.8–10.5)

## 2024-02-16 PROCEDURE — 99285 EMERGENCY DEPT VISIT HI MDM: CPT | Mod: 25

## 2024-02-16 PROCEDURE — 84484 ASSAY OF TROPONIN QUANT: CPT

## 2024-02-16 PROCEDURE — 70450 CT HEAD/BRAIN W/O DYE: CPT | Mod: 26,MA

## 2024-02-16 PROCEDURE — 93010 ELECTROCARDIOGRAM REPORT: CPT

## 2024-02-16 PROCEDURE — 83735 ASSAY OF MAGNESIUM: CPT

## 2024-02-16 PROCEDURE — 36415 COLL VENOUS BLD VENIPUNCTURE: CPT

## 2024-02-16 PROCEDURE — 70450 CT HEAD/BRAIN W/O DYE: CPT | Mod: MA

## 2024-02-16 PROCEDURE — 85025 COMPLETE CBC W/AUTO DIFF WBC: CPT

## 2024-02-16 PROCEDURE — 99285 EMERGENCY DEPT VISIT HI MDM: CPT

## 2024-02-16 PROCEDURE — 80053 COMPREHEN METABOLIC PANEL: CPT

## 2024-02-16 PROCEDURE — 93005 ELECTROCARDIOGRAM TRACING: CPT

## 2024-02-16 RX ORDER — POTASSIUM CHLORIDE 20 MEQ
40 PACKET (EA) ORAL ONCE
Refills: 0 | Status: COMPLETED | OUTPATIENT
Start: 2024-02-16 | End: 2024-02-16

## 2024-02-16 RX ADMIN — Medication 40 MILLIEQUIVALENT(S): at 19:00

## 2024-02-16 NOTE — ED ADULT NURSE NOTE - NSFALLUNIVINTERV_ED_ALL_ED
Bed/Stretcher in lowest position, wheels locked, appropriate side rails in place/Call bell, personal items and telephone in reach/Instruct patient to call for assistance before getting out of bed/chair/stretcher/Non-slip footwear applied when patient is off stretcher/[place] to call system/Physically safe environment - no spills, clutter or unnecessary equipment/Purposeful proactive rounding/Room/bathroom lighting operational, light cord in reach

## 2024-02-16 NOTE — ED ADULT NURSE NOTE - OBJECTIVE STATEMENT
Pt presents to er with complaints of having a shaking like episode last night lasting 10mins, states it has never happened prior, denies chills, fevers, states he has a history of HTN and is on Digoxin, states he is up to date with all of his medications this morning, denies chest pain, sob.

## 2024-02-16 NOTE — ED ADULT TRIAGE NOTE - CHIEF COMPLAINT QUOTE
Patient presents to ED ambulatory complaining of arms and legs shaking after standing up from a chair. Pt states he was visiting his brother in a hospital, was sitting down, stood up "my arms and legs started to shake, the nurse grabbed me and put me in the chair." pt denies loss of consciousness, falling, dizziness.

## 2024-02-16 NOTE — ED STATDOCS - OBJECTIVE STATEMENT
72 y/o male with PMHx of HTN, presents to the ED c/o near syncopal episode earlier today. Pt states that he was visiting his brother in rehab center, was in warm room wearing his jacket and he then stood up, took a few steps and then states his whole body starting shaking, there was nurse in the room who helped him to a chair. pt denies feeling lightheaded, chest pain, SOB, diaphoresis, N/V, LOC. Pt able to recall entire event.

## 2024-02-16 NOTE — ED STATDOCS - PATIENT PORTAL LINK FT
You can access the FollowMyHealth Patient Portal offered by [facility] by registering at the following website: [email or web address]. By joining [facility]’s FollowMyHealth portal, you will also be able to view your health information using other applications (apps) compatible with our system.

## 2024-02-16 NOTE — ED STATDOCS - CLINICAL SUMMARY MEDICAL DECISION MAKING FREE TEXT BOX
EKG was performed independently interpreted by myself revealed normal sinus rhythm, LVH, rate 62, , , QTc 422. Navid Bahena D.O. EKG was performed independently interpreted by myself revealed normal sinus rhythm, LVH, rate 62, , , QTc 422. Navid Bahena D.ONeel    Labs demonstrate mild leukocytosis 11.07, normal hemoglobin, normal platelets.  Mild hypokalemia 3.1 repleted in ED.  Creatinine is within normal limits, troponin within normal limits, CT head was performed which was unremarkable.  Patient well on reevaluation.  Okay for discharge home at this time do not suspect seizure.  More likely vasovagal syncope.  Follow closely with PCP, cardiology.  Strict return precautions given for any worsening.  Patient verbalized understanding agrees plan this time.

## 2024-02-16 NOTE — ED STATDOCS - NSFOLLOWUPINSTRUCTIONS_ED_ALL_ED_FT
Near-Syncope  Outline of the head showing blood vessels that supply the brain.  Near-syncope is when you suddenly feel like you might pass out or faint, but you do not actually lose consciousness. This may also be referred to as presyncope. During an episode of near-syncope, you may:  Feel dizzy, weak, light-headed, or like the room is spinning.  Feel nauseous.  See spots or see all white or all black in your field of vision.  Have cold, clammy skin or feel warm and sweaty.  Hear ringing in your ears (tinnitus).  This condition is caused by a sudden decrease in blood flow to the brain. This decrease can result from various causes, but most of those causes are not dangerous. However, near-syncope may be a sign of a serious medical problem, so it is important to seek medical care.    Follow these instructions at home:  Medicines    Take over-the-counter and prescription medicines only as told by your health care provider.  If you are taking blood pressure or heart medicine, get up slowly and take several minutes to sit and then stand. This can reduce dizziness and decrease the risk of near-syncope.  Lifestyle    Do not drive, use machinery, or play sports until your health care provider says it is okay.  Do not drink alcohol.  Do not use any products that contain nicotine or tobacco. These products include cigarettes, chewing tobacco, and vaping devices, such as e-cigarettes. If you need help quitting, ask your health care provider.  Avoid hot tubs and saunas.  General instructions    Pay attention to any changes in your symptoms.  Talk with your health care provider about your symptoms. You may need to have testing to understand the cause of your near-syncope.  If you start to feel like you might faint, sit or lie down right away. If sitting, put your head down between your legs. If lying down, raise (elevate) your feet above the level of your heart.  Breathe deeply and steadily. Wait until all of the symptoms have passed.  Have someone stay with you until you feel stable.  Drink enough fluid to keep your urine pale yellow.  Avoid prolonged standing. If you must stand for a long time, do movements such as:  Moving your legs.  Crossing your legs.  Flexing and stretching your leg muscles.  Squatting.  Keep all follow-up visits. This is important.  Contact a health care provider if:  You continue to have episodes of near fainting.  Get help right away if:  You faint.  You have any of these symptoms that may indicate trouble with your heart:  Fast or irregular heartbeats (palpitations).  Unusual pain in your chest, abdomen, or back.  Shortness of breath.  You have a seizure.  You have a severe headache.  You are confused.  You have vision problems.  You have severe weakness or trouble walking.  You are bleeding from your mouth or rectum, or have black or tarry stool.  These symptoms may represent a serious problem that is an emergency. Do not wait to see if your symptoms will go away. Get medical help right away. Call your local emergency services (911 in the [place] Do not drive yourself to the hospital.    Summary  Near-syncope is when you suddenly feel like you might pass out or faint, but you do not actually lose consciousness.  This condition is caused by a sudden decrease in blood flow to the brain. This decrease can result from various causes, but most of those causes are not dangerous.  Near-syncope may be a sign of a serious medical problem, so it is important to seek medical care.  If you start to feel like you might faint, sit or lie down right away. If sitting, put your head down between your legs. If lying down, raise (elevate) your feet above the level of your heart.  Talk with your health care provider about your symptoms. You may need to have testing to understand the cause of your near-syncope.

## 2024-02-16 NOTE — ED STATDOCS - PROGRESS NOTE DETAILS
[de-identified] : This is annual Preventative examination for this 65 year year old White male.  The patient has been generally feeling well with no new complaints. A complete evaluation of their current medication was reviewed with them including OTC medication .\par \par Chronic medical problems for which they are being followed by a physician are:\par CLL\par    \par \par Exercises regularly: Yes\par Still has not had a colonoscopy.\par .\par \par \par  73 year old male presents to ED c/o near syncopal episode this morning, in which pt stood up from chair went to walk and started shaking uncontrollably for approx 10 minutes. Pt able to recall entire episode. Denies LOC, head trauma, tongue biting, dizziness, cp, sob. No symptoms at this time. Vitals are stable on arrival. PE unremarkable. Will follow labs, EKG, CTH, reassess. - Rebeca Gleason PA-C Unremarkable results with labs and CT. Discussed with pt. Pt appears fine now and no symptoms. WIll d/c home and have pt f/u with pmd and cardio. Pt already has f/u with Dr Jacob next week. -Fidencio York PA-C

## 2024-02-18 ENCOUNTER — TRANSCRIPTION ENCOUNTER (OUTPATIENT)
Age: 74
End: 2024-02-18

## 2024-02-18 LAB
ALBUMIN SERPL ELPH-MCNC: 4.3 G/DL
ALP BLD-CCNC: 65 U/L
ALT SERPL-CCNC: 21 U/L
ANION GAP SERPL CALC-SCNC: 11 MMOL/L
AST SERPL-CCNC: 19 U/L
BASOPHILS # BLD AUTO: 0.07 K/UL
BASOPHILS NFR BLD AUTO: 0.8 %
BILIRUB SERPL-MCNC: 0.4 MG/DL
BUN SERPL-MCNC: 27 MG/DL
CALCIUM SERPL-MCNC: 9.7 MG/DL
CHLORIDE SERPL-SCNC: 101 MMOL/L
CHOLEST SERPL-MCNC: 198 MG/DL
CO2 SERPL-SCNC: 28 MMOL/L
CREAT SERPL-MCNC: 0.91 MG/DL
EGFR: 90 ML/MIN/1.73M2
EOSINOPHIL # BLD AUTO: 0.1 K/UL
EOSINOPHIL NFR BLD AUTO: 1.1 %
ESTIMATED AVERAGE GLUCOSE: 131 MG/DL
GLUCOSE SERPL-MCNC: 129 MG/DL
HBA1C MFR BLD HPLC: 6.2 %
HCT VFR BLD CALC: 40.2 %
HDLC SERPL-MCNC: 63 MG/DL
HGB BLD-MCNC: 13.6 G/DL
IMM GRANULOCYTES NFR BLD AUTO: 0.9 %
LDLC SERPL CALC-MCNC: 117 MG/DL
LYMPHOCYTES # BLD AUTO: 3.08 K/UL
LYMPHOCYTES NFR BLD AUTO: 34.5 %
MAN DIFF?: NORMAL
MCHC RBC-ENTMCNC: 31.7 PG
MCHC RBC-ENTMCNC: 33.8 GM/DL
MCV RBC AUTO: 93.7 FL
MONOCYTES # BLD AUTO: 0.76 K/UL
MONOCYTES NFR BLD AUTO: 8.5 %
NEUTROPHILS # BLD AUTO: 4.84 K/UL
NEUTROPHILS NFR BLD AUTO: 54.2 %
NONHDLC SERPL-MCNC: 135 MG/DL
PLATELET # BLD AUTO: 202 K/UL
POTASSIUM SERPL-SCNC: 3.6 MMOL/L
PROT SERPL-MCNC: 6.6 G/DL
PSA SERPL-MCNC: 2.05 NG/ML
RBC # BLD: 4.29 M/UL
RBC # FLD: 12.2 %
SODIUM SERPL-SCNC: 140 MMOL/L
TRIGL SERPL-MCNC: 106 MG/DL
WBC # FLD AUTO: 8.93 K/UL

## 2024-02-19 ENCOUNTER — TRANSCRIPTION ENCOUNTER (OUTPATIENT)
Age: 74
End: 2024-02-19

## 2024-02-21 ENCOUNTER — APPOINTMENT (OUTPATIENT)
Dept: FAMILY MEDICINE | Facility: CLINIC | Age: 74
End: 2024-02-21
Payer: MEDICARE

## 2024-02-21 VITALS
SYSTOLIC BLOOD PRESSURE: 132 MMHG | BODY MASS INDEX: 29.55 KG/M2 | HEIGHT: 68 IN | HEART RATE: 97 BPM | OXYGEN SATURATION: 96 % | DIASTOLIC BLOOD PRESSURE: 72 MMHG | TEMPERATURE: 97.2 F | WEIGHT: 195 LBS

## 2024-02-21 DIAGNOSIS — E87.6 HYPOKALEMIA: ICD-10-CM

## 2024-02-21 DIAGNOSIS — R56.9 UNSPECIFIED CONVULSIONS: ICD-10-CM

## 2024-02-21 PROCEDURE — 36415 COLL VENOUS BLD VENIPUNCTURE: CPT

## 2024-02-21 PROCEDURE — 99213 OFFICE O/P EST LOW 20 MIN: CPT

## 2024-02-21 RX ORDER — PREDNISONE 10 MG/1
10 TABLET ORAL
Qty: 20 | Refills: 2 | Status: COMPLETED | COMMUNITY
Start: 2023-04-06 | End: 2024-02-21

## 2024-02-21 RX ORDER — DOXYCYCLINE HYCLATE 100 MG/1
100 CAPSULE ORAL
Qty: 20 | Refills: 0 | Status: COMPLETED | COMMUNITY
Start: 2023-02-22 | End: 2024-02-21

## 2024-02-21 NOTE — HISTORY OF PRESENT ILLNESS
[FreeTextEntry8] : - pt had an event on 2/16 while visiting his brother in law in rehab where he got up to leave and had a seizure like activity where both legs and arms were shaking, he did not lose consciousness. The nurse helped him down. He drank water, still felt slightly off and drove home - at home, went to  ED where they did labs (hypokalemia K 3.1), brain CT WNL - was supplemented with potassium in the hospital but not since then  - seeing cards Dr. 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

## 2024-02-21 NOTE — PLAN
[FreeTextEntry1] : - see neuro and cards as recommended  - avoid driving until see them in case another episode  - rechecked potassium level, no obvious reason for hypokalemia in the hospital

## 2024-02-22 ENCOUNTER — TRANSCRIPTION ENCOUNTER (OUTPATIENT)
Age: 74
End: 2024-02-22

## 2024-02-22 LAB
ALBUMIN SERPL ELPH-MCNC: 4.4 G/DL
ALP BLD-CCNC: 70 U/L
ALT SERPL-CCNC: 24 U/L
ANION GAP SERPL CALC-SCNC: 14 MMOL/L
AST SERPL-CCNC: 17 U/L
BILIRUB SERPL-MCNC: 0.6 MG/DL
BUN SERPL-MCNC: 28 MG/DL
CALCIUM SERPL-MCNC: 9.8 MG/DL
CHLORIDE SERPL-SCNC: 99 MMOL/L
CO2 SERPL-SCNC: 25 MMOL/L
CREAT SERPL-MCNC: 1.01 MG/DL
EGFR: 80 ML/MIN/1.73M2
GLUCOSE SERPL-MCNC: 139 MG/DL
POTASSIUM SERPL-SCNC: 3.9 MMOL/L
PROT SERPL-MCNC: 6.9 G/DL
SODIUM SERPL-SCNC: 138 MMOL/L

## 2024-03-12 NOTE — H&P PST ADULT - PROBLEM/PLAN-3
Primary Care Provider  Taylor Blake MD   Referring Provider  No ref. provider found      Patient Complaint  Lung Nodule and Follow-up (Pt here for follow up/post Ion )      Subjective          Chinmay Roque presents to [facility]      History of Presenting Illness  Chinmay Roque is a 75 y.o. male here for evaluation for solitary lung nodule.  His last visit CT showed stable 7 x 9 mm right upper lobe lung nodule that appeared slightly spiculated.  Notify lung was very low risk for pulmonary malignancy.  Patient underwent robotic navigational bronchoscopy that was negative for malignant cells.  He did have a 3-month follow-up chest CT recently which showed no change in size of this lung nodule of the right upper lobe as well as other stable lung nodules.  From a pulmonary perspective, he is asymptomatic and denies any complaints. Denies dyspnea, coughing, wheezing, headaches, chest pain, weight loss or hemoptysis. Denies fevers, chills and night sweats.  He is able to perform ADLs without difficulties and denies any swollen glands/lymph nodes in the head or neck.    Patient and family had high level of concern given his family history of lung cancer    Family History   Problem Relation Age of Onset    Hyperlipidemia Mother     Cancer Father     Prostate cancer Brother     Colon cancer Neg Hx     Malig Hyperthermia Neg Hx         Social History     Socioeconomic History    Marital status:    Tobacco Use    Smoking status: Never     Passive exposure: Never    Smokeless tobacco: Never   Vaping Use    Vaping status: Never Used   Substance and Sexual Activity    Alcohol use: Yes     Alcohol/week: 2.0 standard drinks of alcohol     Types: 2 Glasses of wine per week     Comment: occasional    Drug use: Never    Sexual activity: Defer     Partners: Female        Past Medical History:   Diagnosis Date    Anemia     HL (hearing loss)     Insomnia     Lung  nodule     Psoriasis     scalp    Tinnitus Feb 2005        Immunization History   Administered Date(s) Administered    COVID-19 (MODERNA) 1st,2nd,3rd Dose Monovalent 01/18/2021, 02/12/2021, 09/10/2021, 04/21/2022    COVID-19 (PFIZER) BIVALENT 12+YRS 09/28/2022    Fluad Quad 65+ 10/06/2021    Fluzone High-Dose 65+yrs 10/06/2022, 10/04/2023    Hepatitis A 05/01/2018, 11/01/2018    Pneumococcal Conjugate 13-Valent (PCV13) 08/01/2015    Pneumococcal Polysaccharide (PPSV23) 04/01/2016    Shingrix 06/01/2019, 08/01/2019    Tdap 05/01/2017, 04/11/2020         No Known Allergies       Current Outpatient Medications:     aspirin 81 MG chewable tablet, Chew 1 tablet Daily., Disp: 90 tablet, Rfl: 1    B Complex Vitamins (VITAMIN B COMPLEX 100 IJ), vitamin B complex oral capsule take 1 capsule by oral route daily   Active, Disp: , Rfl:     betamethasone dipropionate 0.05 % lotion, Apply  topically to the appropriate area as directed Daily., Disp: 180 mL, Rfl: 1    clonazePAM (KlonoPIN) 1 MG tablet, 1 tablet Every Night., Disp: , Rfl:     donepezil (ARICEPT) 10 MG tablet, Take 1 tablet by mouth Daily. (Patient taking differently: Take 1 tablet by mouth Every Night.), Disp: 90 tablet, Rfl: 1    etodolac XL (LODINE XL) 400 MG 24 hr tablet, Take 1 tablet by mouth 2 (Two) Times a Day With Meals. (Patient taking differently: Take 1 tablet by mouth As Needed.), Disp: 180 tablet, Rfl: 1    multivitamin with minerals tablet tablet, Take 1 tablet by mouth Daily., Disp: , Rfl:     nystatin-triamcinolone (MYCOLOG) 291734-9.1 UNIT/GM-% ointment, Apply 1 Application topically to the appropriate area as directed 2 (Two) Times a Day., Disp: 180 g, Rfl: 4    polysacchar iron-FA-B12 (Ferrex 150 Forte) 150-1-25 MG-MG-MCG capsule capsule, Take 1 capsule by mouth 2 (Two) Times a Day., Disp: 180 capsule, Rfl: 1    QUEtiapine (SEROquel) 400 MG tablet, Take 1-2 tablets by mouth Every Night., Disp: 180 tablet, Rfl: 2    sildenafil (REVATIO) 20 MG  "tablet, Take 1 tablet by mouth Daily As Needed (Sexual Activity). Take 2-4 tablets 1 hour prior to sexual activity as needed, Disp: 90 tablet, Rfl: 1    vitamin D3 125 MCG (5000 UT) capsule capsule, Take 1 capsule by mouth Daily., Disp: , Rfl:     amoxicillin-clavulanate (AUGMENTIN) 875-125 MG per tablet, Take 1 tablet by mouth 2 (Two) Times a Day As Needed (Tinnitis in the ears). (Patient not taking: Reported on 3/12/2024), Disp: , Rfl:          Objective     Vital Signs:   /68 (BP Location: Left arm, Patient Position: Sitting, Cuff Size: Adult)   Pulse 71   Resp 16   Ht 193 cm (76\")   Wt 95.6 kg (210 lb 12.8 oz)   SpO2 97% Comment: room air  BMI 25.66 kg/m²     Physical Exam  Vital Signs Reviewed   WDWN, Alert, NAD.    HEENT:  PERRL, EOMI.  OP, nares clear  Chest:  good aeration, clear to auscultation bilaterally, tympanic to percussion bilaterally, no work of breathing noted  CV: RRR, no MGR, pulses 2+, equal.  Abd:  Soft, NT, ND, + BS, no HSM  EXT:  no clubbing, no cyanosis, no edema  Neuro:  A&Ox3, CN grossly intact, no focal deficits.  Skin: No rashes or lesions noted       Result Review :   I have personally reviewed my last office note.  I personally reviewed multiple chest CT showing stable lung nodules, largest being right upper lobe 9 mm nodule is partially spiculated.  This is stable in size over 3 months.  Notify lung liquid biopsy shows a less than 2% risk of pulmonary malignancy.  Robotic navigational bronchoscopy biopsy results reviewed showing no evidence of malignant cells.  My last bronchoscopy note was personally reviewed.       Assessment and Plan        * No active hospital problems. *      Impression:  Multiple lung nodules.  Index nodule being right upper lobe 7 x 9 mm  partially spiculated lung nodule.  Stable in size x 3 months.  Liquid biopsy you risks show very low risk of lung cancer.  Robotic navigational bronchoscopy was negative for malignant cells  Never " smoker  Inhalational exposure to mold, dust  Family history of lung cancer    Plan:  Discussed with the patient and family that all findings are pointing towards this lung nodule being benign.  At this point I would recommend surveillance imaging per Fleischner criteria.  We we will repeat noncontrast chest CT in 3 months.  If stable in size we will repeat imaging 6 months after that, followed by 1 year after that scan to document 24 months of stability.  Provided patient and family reassurance that I feel that this nodule is benign and we will do close surveillance over the next 2 years  Encourage activity  Smoking status: Never smoker  Vaccination status: Up-to-date with Prevnar, Pneumovax, flu vaccine.  Defers RSV vaccine at this time  Medications personally reviewed.    Follow Up   Return in about 3 months (around 6/12/2024).  Patient was given instructions and counseling regarding his condition or for health maintenance advice. Please see specific information pulled into the AVS if appropriate.     Electronically signed by Jesse Monteiro MD, 03/12/24, 10:15 AM EDT.          DISPLAY PLAN FREE TEXT

## 2024-03-27 ENCOUNTER — APPOINTMENT (OUTPATIENT)
Dept: FAMILY MEDICINE | Facility: CLINIC | Age: 74
End: 2024-03-27

## 2024-04-30 ENCOUNTER — APPOINTMENT (OUTPATIENT)
Dept: FAMILY MEDICINE | Facility: CLINIC | Age: 74
End: 2024-04-30
Payer: COMMERCIAL

## 2024-04-30 VITALS
HEIGHT: 68 IN | SYSTOLIC BLOOD PRESSURE: 128 MMHG | DIASTOLIC BLOOD PRESSURE: 70 MMHG | HEART RATE: 64 BPM | WEIGHT: 204 LBS | BODY MASS INDEX: 30.92 KG/M2 | OXYGEN SATURATION: 100 % | TEMPERATURE: 97.8 F

## 2024-04-30 DIAGNOSIS — E78.2 MIXED HYPERLIPIDEMIA: ICD-10-CM

## 2024-04-30 DIAGNOSIS — Z00.00 ENCOUNTER FOR GENERAL ADULT MEDICAL EXAMINATION W/OUT ABNORMAL FINDINGS: ICD-10-CM

## 2024-04-30 DIAGNOSIS — G89.29 DORSALGIA, UNSPECIFIED: ICD-10-CM

## 2024-04-30 DIAGNOSIS — R73.01 IMPAIRED FASTING GLUCOSE: ICD-10-CM

## 2024-04-30 DIAGNOSIS — N13.8 BENIGN PROSTATIC HYPERPLASIA WITH LOWER URINARY TRACT SYMPMS: ICD-10-CM

## 2024-04-30 DIAGNOSIS — M54.9 DORSALGIA, UNSPECIFIED: ICD-10-CM

## 2024-04-30 DIAGNOSIS — I10 ESSENTIAL (PRIMARY) HYPERTENSION: ICD-10-CM

## 2024-04-30 DIAGNOSIS — N40.1 BENIGN PROSTATIC HYPERPLASIA WITH LOWER URINARY TRACT SYMPMS: ICD-10-CM

## 2024-04-30 PROCEDURE — 99397 PER PM REEVAL EST PAT 65+ YR: CPT

## 2024-04-30 RX ORDER — ATORVASTATIN CALCIUM 40 MG/1
40 TABLET, FILM COATED ORAL
Refills: 0 | Status: ACTIVE | COMMUNITY

## 2024-04-30 RX ORDER — ATORVASTATIN CALCIUM 20 MG/1
20 TABLET, FILM COATED ORAL DAILY
Qty: 90 | Refills: 0 | Status: COMPLETED | COMMUNITY
End: 2024-04-30

## 2024-04-30 NOTE — HISTORY OF PRESENT ILLNESS
[FreeTextEntry1] : CHATO BAEZ is a 73 year old male here for a physical exam. [de-identified] : His last physical exam was 9/2022  Vaccines: Tetanus is up to date; last 12/2017 Pneumococcal vaccination is up to date Shingrix is NOT up to date  His last dentist visit was less than one year ago His last eye doctor appointment was less than one year ago His last dermatologist visit was less than one year ago  Colon cancer screening is NOT up to date; last colonoscopy was 4/17/2014 by Dr. Mcguire in [place]  His diet is healthy overall Exercise: walking

## 2024-04-30 NOTE — HEALTH RISK ASSESSMENT
[No falls in past year] : Patient reported no falls in the past year [0] : 2) Feeling down, depressed, or hopeless: Not at all (0) [PHQ-2 Negative - No further assessment needed] : PHQ-2 Negative - No further assessment needed [Never] : Never [[id number]] : 0

## 2024-04-30 NOTE — ASSESSMENT
[FreeTextEntry1] : CHATO BAEZ is a 73 year old male here for a physical exam.  He has a history of hypertension, hypercholesterolemia, impaired fasting glucose, and BPH.  Labs were done prior. His CBC and CMP were normal. His LDL cholesterol was higher than previous but his total cholesterol was lower. His fasting glucose was 129 and his HgbA1c was 6.2.   He sees a cardiologist (Dr. Arcos) regularly and does not need an EKG today.  He had a seizure-like episode in 2/2024. Workup in the ER was negative except for hyperkalemia. He was advised to follow up with cardiology and neurology.  He saw Dr. Arcos on 2/29 and she documented his symptoms as near-syncope rather than a seizure. The patient states that he was conscious throughout the episode so near-syncope does make more sense than a seizure. He drinks regularly, several ounces of vodka every night. Dr. Arcos increased his Atorvastatin from 20 to 40 mg.  He saw a neurologist who did not feel that he had a seizure. He had a negative MRI of his brain.   He had a STOP-BANG score of 4 today. He reports that he tested negative for sleep apnea in the past and does not snore. He does not feel he needs a sleep study.

## 2024-04-30 NOTE — PLAN
[FreeTextEntry1] : Continue all medications as prescribed. He would prefer to wait at least a month before repeating his labs since he only recently returned to the gym.  Reviewed age-appropriate preventive screening tests with patient. He is due for a colonoscopy. He will return for Shingrix.  Discussed clean eating (eg Mediterranean style eating plan) and regular exercise/staying as physically active as possible.  Include balance exercises and strength training and core strengthening exercises for bone health and to decrease risk for falls.  Reviewed importance of good self care (e.g. meditation, yoga, adequate rest, regular exercise, magnesium, clean eating, etc.).  Follow up for next physical in one year.

## 2024-10-01 ENCOUNTER — NON-APPOINTMENT (OUTPATIENT)
Age: 74
End: 2024-10-01

## 2024-10-23 ENCOUNTER — NON-APPOINTMENT (OUTPATIENT)
Age: 74
End: 2024-10-23

## 2024-11-05 NOTE — CHART NOTE - NSCHARTNOTEFT_GEN_A_CORE
CC: Monoclonal Antibody Infusion - COVID 19 Positive    History: Patient presents for infusion of monoclonal antibody infusion. Patient has been screened and was deemed to be a candidate.    Date tested positive: 11/28/21    COVID Symptoms: Yes  Date of onset: 11/27/21  * Fever-  * Headache-  * congestion -       Risk Profile includes:   * Htn-     * Obesity-    * OTHER: Age >65      Vaccination Status: Documented by RN  Date received 2nd dose:       No Known Allergies      sodium chloride 0.9%. 250 milliLiter(s) IV Continuous <Continuous>        PMHx:  Infection due to severe acute respiratory syndrome coronavirus 2 (SARS-CoV-2)    MEWS Score    Hyperlipidemia, unspecified hyperlipidemia type    Hypertension, unspecified type    Gastroesophageal reflux disease without esophagitis    Kidney cysts    Calculus in bladder    Obesity (BMI 30.0-34.9)    Shoulder disorder    Kidney stone    Benign prostatic hyperplasia with urinary frequency    H/O lithotripsy    H/O shoulder surgery          T(C): 36.8 (12-03-21 @ 08:30), Max: 36.8 (12-03-21 @ 07:59)  HR: 65 (12-03-21 @ 08:30) (65 - 65)  BP: 153/83 (12-03-21 @ 08:30) (153/83 - 166/80)  RR: 19 (12-03-21 @ 08:30) (18 - 19)  SpO2: 97% (12-03-21 @ 08:30) (97% - 97%)      PE- Well developed, well-nourish, resting comfortably in NAD.   Neuro- A&Ox3, no gross sensory deficits to light touch or motor weaknesses.   Vasc- No peripheral edema or venous stasis noted.  Skin- No ecchymosis or bleeding.  MS- No bony tenderness       ASSESSMENT:  Pt is a 71y year old Male COVID positive and symptomatic who was referred to the infusion center for Monoclonal antibody infusion (Regeneron).      PLAN:  - infusion procedure explained to patient   - Consent for monoclonal antibody infusion obtained   - Risk & benefits discussed/all questions answered  - infusion of Regeneron   - will observe patient for one hour post infusion  and then if stable discharge home with outpatient follow up as planned by PMD.    POST INFUSION ASSESSMENT:   DISCHARGE at approximately 1030  hours    - Patient tolerated infusion well denies complaints of chest pain, SOB, dizziness or palpitations  - VSS for discharge home  - D/C instructions given/ fact sheet included.  - Patient to follow-up with PCP as needed. 1-2 drinks

## 2024-11-13 ENCOUNTER — RX RENEWAL (OUTPATIENT)
Age: 74
End: 2024-11-13

## 2024-11-19 ENCOUNTER — RX RENEWAL (OUTPATIENT)
Age: 74
End: 2024-11-19

## 2025-02-18 ENCOUNTER — OFFICE (OUTPATIENT)
Dept: URBAN - METROPOLITAN AREA CLINIC 102 | Facility: CLINIC | Age: 75
Setting detail: OPHTHALMOLOGY
End: 2025-02-18
Payer: COMMERCIAL

## 2025-02-18 DIAGNOSIS — H52.4: ICD-10-CM

## 2025-02-18 DIAGNOSIS — H25.13: ICD-10-CM

## 2025-02-18 DIAGNOSIS — H40.033: ICD-10-CM

## 2025-02-18 PROBLEM — H52.7 REFRACTIVE ERROR: Status: ACTIVE | Noted: 2025-02-18

## 2025-02-18 PROCEDURE — 92002 INTRM OPH EXAM NEW PATIENT: CPT | Performed by: OPHTHALMOLOGY

## 2025-02-18 PROCEDURE — 92020 GONIOSCOPY: CPT | Performed by: OPHTHALMOLOGY

## 2025-02-18 PROCEDURE — 92015 DETERMINE REFRACTIVE STATE: CPT | Performed by: OPHTHALMOLOGY

## 2025-02-18 ASSESSMENT — REFRACTION_AUTOREFRACTION
OS_SPHERE: -2.25
OD_CYLINDER: -1.75
OD_AXIS: 093
OD_SPHERE: -1.75
OS_CYLINDER: -2.25
OS_AXIS: 051

## 2025-02-18 ASSESSMENT — REFRACTION_CURRENTRX
OD_SPHERE: -2.00
OS_AXIS: 076
OD_OVR_VA: 20/
OS_CYLINDER: -1.75
OS_SPHERE: -2.50
OS_ADD: +2.50
OS_VPRISM_DIRECTION: PROGS
OD_CYLINDER: -2.00
OD_ADD: +2.50
OD_AXIS: 106
OD_VPRISM_DIRECTION: PROGS
OS_OVR_VA: 20/

## 2025-02-18 ASSESSMENT — REFRACTION_MANIFEST
OD_AXIS: 092
OS_VA1: 20/20
OD_VA1: 20/20
OD_SPHERE: -2.00
OS_AXIS: 045
OD_CYLINDER: -1.25
OS_SPHERE: -2.00
OS_ADD: +2.50
OS_CYLINDER: -2.25
OD_ADD: +2.50

## 2025-02-18 ASSESSMENT — TONOMETRY
OS_IOP_MMHG: 17
OS_IOP_MMHG: 19
OD_IOP_MMHG: 17
OD_IOP_MMHG: 19

## 2025-02-18 ASSESSMENT — KERATOMETRY
OD_K1POWER_DIOPTERS: 44.25
METHOD_AUTO_MANUAL: AUTO
OS_AXISANGLE_DEGREES: 127
OS_K2POWER_DIOPTERS: 46.50
OD_AXISANGLE_DEGREES: 032
OS_K1POWER_DIOPTERS: 45.00
OD_K2POWER_DIOPTERS: 45.50

## 2025-02-18 ASSESSMENT — CONFRONTATIONAL VISUAL FIELD TEST (CVF)
OS_FINDINGS: FULL
OD_FINDINGS: FULL

## 2025-02-18 ASSESSMENT — VISUAL ACUITY
OS_BCVA: 20/20-2
OD_BCVA: 20/30

## 2025-04-09 ENCOUNTER — NON-APPOINTMENT (OUTPATIENT)
Age: 75
End: 2025-04-09

## 2025-04-28 ENCOUNTER — NON-APPOINTMENT (OUTPATIENT)
Age: 75
End: 2025-04-28

## 2025-04-29 ENCOUNTER — APPOINTMENT (OUTPATIENT)
Dept: FAMILY MEDICINE | Facility: CLINIC | Age: 75
End: 2025-04-29
Payer: COMMERCIAL

## 2025-04-29 PROCEDURE — 36415 COLL VENOUS BLD VENIPUNCTURE: CPT

## 2025-04-30 DIAGNOSIS — E11.9 TYPE 2 DIABETES MELLITUS W/OUT COMPLICATIONS: ICD-10-CM

## 2025-04-30 LAB
ALBUMIN SERPL ELPH-MCNC: 4.2 G/DL
ALP BLD-CCNC: 76 U/L
ALT SERPL-CCNC: 28 U/L
ANION GAP SERPL CALC-SCNC: 14 MMOL/L
AST SERPL-CCNC: 22 U/L
BASOPHILS # BLD AUTO: 0.05 K/UL
BASOPHILS NFR BLD AUTO: 0.8 %
BILIRUB SERPL-MCNC: 0.6 MG/DL
BUN SERPL-MCNC: 29 MG/DL
CALCIUM SERPL-MCNC: 9.6 MG/DL
CHLORIDE SERPL-SCNC: 102 MMOL/L
CHOLEST SERPL-MCNC: 172 MG/DL
CO2 SERPL-SCNC: 25 MMOL/L
CREAT SERPL-MCNC: 1.1 MG/DL
EGFRCR SERPLBLD CKD-EPI 2021: 71 ML/MIN/1.73M2
EOSINOPHIL # BLD AUTO: 0.28 K/UL
EOSINOPHIL NFR BLD AUTO: 4.4 %
ESTIMATED AVERAGE GLUCOSE: 143 MG/DL
GLUCOSE SERPL-MCNC: 151 MG/DL
HBA1C MFR BLD HPLC: 6.6 %
HCT VFR BLD CALC: 42.2 %
HDLC SERPL-MCNC: 37 MG/DL
HGB BLD-MCNC: 13.7 G/DL
IMM GRANULOCYTES NFR BLD AUTO: 0.5 %
LDLC SERPL-MCNC: 83 MG/DL
LYMPHOCYTES # BLD AUTO: 2.28 K/UL
LYMPHOCYTES NFR BLD AUTO: 35.6 %
MAN DIFF?: NORMAL
MCHC RBC-ENTMCNC: 31.3 PG
MCHC RBC-ENTMCNC: 32.5 G/DL
MCV RBC AUTO: 96.3 FL
MONOCYTES # BLD AUTO: 0.9 K/UL
MONOCYTES NFR BLD AUTO: 14 %
NEUTROPHILS # BLD AUTO: 2.87 K/UL
NEUTROPHILS NFR BLD AUTO: 44.7 %
NONHDLC SERPL-MCNC: 134 MG/DL
PLATELET # BLD AUTO: 221 K/UL
POTASSIUM SERPL-SCNC: 3.7 MMOL/L
PROT SERPL-MCNC: 7.1 G/DL
PSA SERPL-MCNC: 1.49 NG/ML
RBC # BLD: 4.38 M/UL
RBC # FLD: 13.3 %
SODIUM SERPL-SCNC: 142 MMOL/L
TRIGL SERPL-MCNC: 311 MG/DL
WBC # FLD AUTO: 6.41 K/UL

## 2025-05-07 ENCOUNTER — APPOINTMENT (OUTPATIENT)
Dept: FAMILY MEDICINE | Facility: CLINIC | Age: 75
End: 2025-05-07

## 2025-05-13 ENCOUNTER — OFFICE (OUTPATIENT)
Dept: URBAN - METROPOLITAN AREA CLINIC 102 | Facility: CLINIC | Age: 75
Setting detail: OPHTHALMOLOGY
End: 2025-05-13
Payer: COMMERCIAL

## 2025-05-13 DIAGNOSIS — H40.031: ICD-10-CM

## 2025-05-13 PROCEDURE — 66761 REVISION OF IRIS: CPT | Mod: RT | Performed by: OPHTHALMOLOGY

## 2025-05-13 ASSESSMENT — REFRACTION_CURRENTRX
OD_SPHERE: -2.00
OD_VPRISM_DIRECTION: PROGS
OD_AXIS: 106
OD_CYLINDER: -2.00
OS_ADD: +2.50
OS_CYLINDER: -1.75
OS_AXIS: 076
OD_OVR_VA: 20/
OS_OVR_VA: 20/
OS_SPHERE: -2.50
OS_VPRISM_DIRECTION: PROGS
OD_ADD: +2.50

## 2025-05-13 ASSESSMENT — REFRACTION_MANIFEST
OS_CYLINDER: -2.25
OD_VA1: 20/20
OS_AXIS: 045
OS_VA1: 20/20
OS_ADD: +2.50
OS_SPHERE: -2.00
OD_ADD: +2.50
OD_CYLINDER: -1.25
OD_AXIS: 092
OD_SPHERE: -2.00

## 2025-05-13 ASSESSMENT — CONFRONTATIONAL VISUAL FIELD TEST (CVF)
OS_FINDINGS: FULL
OD_FINDINGS: FULL

## 2025-05-13 ASSESSMENT — KERATOMETRY
METHOD_AUTO_MANUAL: AUTO
OS_K1POWER_DIOPTERS: 45.25
OS_AXISANGLE_DEGREES: 129
OD_AXISANGLE_DEGREES: 026
OD_K2POWER_DIOPTERS: 45.50
OS_K2POWER_DIOPTERS: 46.00
OD_K1POWER_DIOPTERS: 44.25

## 2025-05-13 ASSESSMENT — REFRACTION_AUTOREFRACTION
OS_CYLINDER: -1.50
OS_AXIS: 055
OD_AXIS: 092
OS_SPHERE: -1.75
OD_SPHERE: -1.00
OD_CYLINDER: -2.00

## 2025-05-13 ASSESSMENT — VISUAL ACUITY
OS_BCVA: 20/20-2
OD_BCVA: 20/25+1

## 2025-05-13 ASSESSMENT — TONOMETRY
OD_IOP_MMHG: 19
OS_IOP_MMHG: 21

## 2025-05-20 ENCOUNTER — OFFICE (OUTPATIENT)
Dept: URBAN - METROPOLITAN AREA CLINIC 102 | Facility: CLINIC | Age: 75
Setting detail: OPHTHALMOLOGY
End: 2025-05-20
Payer: COMMERCIAL

## 2025-05-20 DIAGNOSIS — H40.033: ICD-10-CM

## 2025-05-20 PROCEDURE — 99024 POSTOP FOLLOW-UP VISIT: CPT | Mod: 79,LT | Performed by: OPHTHALMOLOGY

## 2025-05-20 ASSESSMENT — REFRACTION_CURRENTRX
OS_AXIS: 076
OD_VPRISM_DIRECTION: PROGS
OS_ADD: +2.50
OD_ADD: +2.50
OD_SPHERE: -2.00
OS_OVR_VA: 20/
OS_SPHERE: -2.50
OD_OVR_VA: 20/
OS_VPRISM_DIRECTION: PROGS
OS_CYLINDER: -1.75
OD_CYLINDER: -2.00
OD_AXIS: 106

## 2025-05-20 ASSESSMENT — REFRACTION_AUTOREFRACTION
OS_SPHERE: -1.75
OS_CYLINDER: -1.50
OS_AXIS: 055
OD_SPHERE: -1.00
OD_AXIS: 092
OD_CYLINDER: -2.00

## 2025-05-20 ASSESSMENT — REFRACTION_MANIFEST
OD_ADD: +2.50
OD_SPHERE: -2.00
OS_CYLINDER: -2.25
OD_VA1: 20/20
OS_ADD: +2.50
OS_VA1: 20/20
OS_SPHERE: -2.00
OD_CYLINDER: -1.25
OS_AXIS: 045
OD_AXIS: 092

## 2025-05-20 ASSESSMENT — KERATOMETRY
OS_AXISANGLE_DEGREES: 129
OS_K2POWER_DIOPTERS: 46.00
OD_K2POWER_DIOPTERS: 45.25
OS_K1POWER_DIOPTERS: 45.25
METHOD_AUTO_MANUAL: AUTO
OD_K1POWER_DIOPTERS: 44.25
OD_AXISANGLE_DEGREES: 028

## 2025-05-20 ASSESSMENT — CONFRONTATIONAL VISUAL FIELD TEST (CVF)
OS_FINDINGS: FULL
OD_FINDINGS: FULL

## 2025-05-20 ASSESSMENT — TONOMETRY
OS_IOP_MMHG: 20
OD_IOP_MMHG: 20

## 2025-05-20 ASSESSMENT — VISUAL ACUITY
OD_BCVA: 20/25-1
OS_BCVA: 20/20-1

## 2025-06-03 ENCOUNTER — OFFICE (OUTPATIENT)
Dept: URBAN - METROPOLITAN AREA CLINIC 102 | Facility: CLINIC | Age: 75
Setting detail: OPHTHALMOLOGY
End: 2025-06-03
Payer: COMMERCIAL

## 2025-06-03 DIAGNOSIS — H25.13: ICD-10-CM

## 2025-06-03 DIAGNOSIS — H40.033: ICD-10-CM

## 2025-06-03 PROCEDURE — 92020 GONIOSCOPY: CPT | Performed by: OPHTHALMOLOGY

## 2025-06-03 PROCEDURE — 99213 OFFICE O/P EST LOW 20 MIN: CPT | Performed by: OPHTHALMOLOGY

## 2025-06-03 ASSESSMENT — REFRACTION_MANIFEST
OS_CYLINDER: -2.25
OS_SPHERE: -2.00
OS_AXIS: 045
OD_VA1: 20/20
OS_VA1: 20/20
OD_CYLINDER: -1.25
OD_AXIS: 092
OS_ADD: +2.50
OD_SPHERE: -2.00
OD_ADD: +2.50

## 2025-06-03 ASSESSMENT — KERATOMETRY
OD_K2POWER_DIOPTERS: 45.25
OD_AXISANGLE_DEGREES: 035
OS_AXISANGLE_DEGREES: 137
OD_K1POWER_DIOPTERS: 44.25
OS_K1POWER_DIOPTERS: 45.00
OS_K2POWER_DIOPTERS: 46.00

## 2025-06-03 ASSESSMENT — REFRACTION_CURRENTRX
OS_AXIS: 076
OS_OVR_VA: 20/
OS_CYLINDER: -1.75
OS_SPHERE: -2.50
OD_OVR_VA: 20/
OD_CYLINDER: -2.00
OD_AXIS: 106
OD_SPHERE: -2.00
OD_ADD: +2.50
OS_ADD: +2.50
OS_VPRISM_DIRECTION: PROGS
OD_VPRISM_DIRECTION: PROGS

## 2025-06-03 ASSESSMENT — REFRACTION_AUTOREFRACTION
OD_SPHERE: -1.75
OS_AXIS: 052
OS_CYLINDER: -1.25
OD_CYLINDER: -1.75
OS_SPHERE: -2.50
OD_AXIS: 090

## 2025-06-03 ASSESSMENT — CONFRONTATIONAL VISUAL FIELD TEST (CVF)
OS_FINDINGS: FULL
OD_FINDINGS: FULL

## 2025-06-03 ASSESSMENT — TONOMETRY
OS_IOP_MMHG: 18
OD_IOP_MMHG: 20

## 2025-06-03 ASSESSMENT — VISUAL ACUITY
OS_BCVA: 20/20-1
OD_BCVA: 20/25-1

## 2025-09-12 ENCOUNTER — RX RENEWAL (OUTPATIENT)
Age: 75
End: 2025-09-12